# Patient Record
Sex: FEMALE | Race: WHITE | Employment: OTHER | URBAN - METROPOLITAN AREA
[De-identification: names, ages, dates, MRNs, and addresses within clinical notes are randomized per-mention and may not be internally consistent; named-entity substitution may affect disease eponyms.]

---

## 2017-06-22 ENCOUNTER — OFFICE VISIT (OUTPATIENT)
Dept: FAMILY MEDICINE | Facility: CLINIC | Age: 47
End: 2017-06-22
Payer: MEDICARE

## 2017-06-22 VITALS
BODY MASS INDEX: 33.17 KG/M2 | WEIGHT: 206.4 LBS | HEART RATE: 110 BPM | SYSTOLIC BLOOD PRESSURE: 120 MMHG | TEMPERATURE: 95.8 F | HEIGHT: 66 IN | OXYGEN SATURATION: 95 % | DIASTOLIC BLOOD PRESSURE: 88 MMHG

## 2017-06-22 DIAGNOSIS — J01.91 ACUTE RECURRENT SINUSITIS, UNSPECIFIED LOCATION: ICD-10-CM

## 2017-06-22 PROCEDURE — 99214 OFFICE O/P EST MOD 30 MIN: CPT | Performed by: INTERNAL MEDICINE

## 2017-06-22 RX ORDER — DOXEPIN HYDROCHLORIDE 10 MG/ML
SOLUTION ORAL
COMMUNITY
Start: 2017-04-25 | End: 2023-06-06

## 2017-06-22 RX ORDER — CEFDINIR 300 MG/1
300 CAPSULE ORAL 2 TIMES DAILY
Qty: 20 CAPSULE | Refills: 0 | Status: SHIPPED | OUTPATIENT
Start: 2017-06-22 | End: 2017-09-18

## 2017-06-22 ASSESSMENT — ANXIETY QUESTIONNAIRES
3. WORRYING TOO MUCH ABOUT DIFFERENT THINGS: NOT AT ALL
2. NOT BEING ABLE TO STOP OR CONTROL WORRYING: NOT AT ALL
GAD7 TOTAL SCORE: 0
IF YOU CHECKED OFF ANY PROBLEMS ON THIS QUESTIONNAIRE, HOW DIFFICULT HAVE THESE PROBLEMS MADE IT FOR YOU TO DO YOUR WORK, TAKE CARE OF THINGS AT HOME, OR GET ALONG WITH OTHER PEOPLE: NOT DIFFICULT AT ALL
6. BECOMING EASILY ANNOYED OR IRRITABLE: NOT AT ALL
5. BEING SO RESTLESS THAT IT IS HARD TO SIT STILL: NOT AT ALL
7. FEELING AFRAID AS IF SOMETHING AWFUL MIGHT HAPPEN: NOT AT ALL
1. FEELING NERVOUS, ANXIOUS, OR ON EDGE: NOT AT ALL

## 2017-06-22 ASSESSMENT — PATIENT HEALTH QUESTIONNAIRE - PHQ9: 5. POOR APPETITE OR OVEREATING: NOT AT ALL

## 2017-06-22 NOTE — PROGRESS NOTES
SUBJECTIVE:                                                    Gilson Mcconnell is a 46 year old female who presents to clinic today for the following health issues:    PMH of PFO (s/p closure) and of multiple CVAs (first one d/t PFO and consequent ones thought to be d/t the INR not reflecting true anticoagulation levels) on warfain followed by chromogenic factor 10, on plavix and on baby aspirin, with history of seizure (sathya-stroke), with residual left upper extremity weakness and some memory loss,       Headaches      Duration: 4 days    Description  Location: bilateral in the temporal area   Character: pressure like squeezing  Frequency:  Constant for 4 days  Duration: 4 days    Intensity:  Moderate to severe    Accompanying signs and symptoms:    Precipitating or Alleviating factors:  Nausea/vomiting: nausea but no vomiting   Dizziness: no  Weakness or numbness: no  Visual changes: none  Fever: YES- on and off: has not been measuring it at home but feels chilled/feverish at times   Sinus or URI symptoms: maybe, she has pressure over her nasal area, which reminds her of her sinus infections but this is also where the pain was with her first CVA-also with that first CVA, she had left upper extremity heaviness.  She has not had new neurological deficits over the past few days. She has not had any head trauma.    She factor 10 was taken last week and was a bit SUBtherapeutic at 41% (per patient, her goal range is 20-30% and it is managed by the INR clinic at [facility] in [place]).      History  Head trauma: YES  Family history of migraines: no   Previous tests for headaches: no   Neurologist evaluations: not recently  Able to do daily activities when headache present: YES  Wake with headaches: YES  Daily pain medication use: no   Any changes in: none    Precipitating or Alleviating factors (light/sound/sleep/caffeine): sleep eyes closed daek    Therapies tried and outcome: Tylenol    Outcome - not for  very long  Frequent/daily pain medication use: no        Problem list and histories reviewed & adjusted, as indicated.  Additional history: as documented    Patient Active Problem List   Diagnosis     Medication monitoring encounter     History of stroke, recurrent ,      Seizure disorder (H)     Mild major depression (H)     CARDIOVASCULAR SCREENING; LDL GOAL LESS THAN 100     APS (antiphospholipid syndrome) (H)     S/P splenectomy     History of pulmonary embolism     Obesity     Hemiparesis affecting left side as late effect of cerebrovascular accident (H)     S/P patent foramen ovale closure     Long term current use of anticoagulant therapy     Cervical high risk HPV (human papillomavirus) test positive     Dermatitis of ear canal     Family history of diabetes mellitus     Malpositioned intrauterine device, initial encounter     Past Surgical History:   Procedure Laterality Date      SECTION      FTP     HEART CATH PFO CLOSURE  2007    PFO closure     REPAIR TONGUE LACER,<2.6CM  2012    [facility]     SPLENECTOMY  1999       Social History   Substance Use Topics     Smoking status: Former Smoker     Packs/day: 0.50     Years: 8.00     Types: Cigarettes     Quit date: 2005     Smokeless tobacco: Never Used     Alcohol use No     Family History   Problem Relation Age of Onset     DIABETES Father      Hypertension Father      CEREBROVASCULAR DISEASE Father      d.      C.A.D. Father 52     d. MI     Obesity Father      CANCER No family hx of          Current Outpatient Prescriptions   Medication Sig Dispense Refill     doxepin (SINEQUAN) 10 MG/ML (HIGH CONC) solution 3-6 ml daily       cefdinir (OMNICEF) 300 MG capsule Take 1 capsule (300 mg) by mouth 2 times daily 20 capsule 0     venlafaxine (EFFEXOR-XR) 150 MG 24 hr capsule Take 1 capsule (150 mg) by mouth daily 90 capsule 0     buPROPion (BUDEPRION XL) 300 MG 24 hr tablet Take 1 tablet (300 mg) by mouth every morning 90 tablet 0  "    dexmethylphenidate (FOCALIN XR) 30 MG CP24 Take 1 tablet by mouth daily 30 capsule 0     lamoTRIgine (LAMICTAL) 200 MG tablet Take 1 tablet (200 mg) by mouth daily (total 250 mg daily) 90 tablet 0     STATIN NOT PRESCRIBED, INTENTIONAL, Statin not prescribed intentionally due to LDL at goal 0 each      warfarin (COUMADIN) 3 MG tablet Take 7 mg by mouth daily        levonorgestrel (MIRENA) 20 MCG/24HR IUD 1 each by Intrauterine route once.       clopidogrel (PLAVIX) 75 MG tablet Take 75 mg by mouth daily.       aspirin 81 MG EC tablet Take 81 mg by mouth daily.       triamcinolone (KENALOG) 0.1 % cream Apply to affected area sparingly twice daily as needed (Patient not taking: Reported on 6/22/2017) 45 g 1       Reviewed and updated as needed this visit by clinical staff       Reviewed and updated as needed this visit by Provider         ==============================================================  ROS:  Constitutional, HEENT, cardiovascular, pulmonary, GI, , musculoskeletal, neuro, skin, endocrine and psych systems are negative, except as otherwise noted.       OBJECTIVE:                                                    /88  Pulse 110  Temp 95.8  F (35.4  C) (Oral)  Ht 5' 6\" (1.676 m)  Wt 206 lb 6.4 oz (93.6 kg)  SpO2 95%  BMI 33.31 kg/m2  Body mass index is 33.31 kg/(m^2).     GEN: not in acute distress  NEURO:  Pupils: PERRLA  baseline left sided mouth droop and left upper extremity arm weakness on pronator test.   O/w:  mentation intact and speech normal  cranial nerves II-XII grossly intact. Also see HENT above   Pronator test of the other extremity within normal limits   Finger-to-nose test within normal limits b/l  Sensation grossly intact    Tenderness of the max sinuses    ASSESSMENT/PLAN:                                                        ICD-10-CM    1. Acute recurrent sinusitis, unspecified location J01.91 cefdinir (OMNICEF) 300 MG capsule     Time spent coordinating care was " approximately 30 minutes out of a total of approximately 40 minutes (which was the total duration of the appointment) including the diagnosis, prognosis and treatment of the above medical conditions.     Patient Instructions     Please take the antibiotics and let us know if you are not better after 3 complete days on it.  You have indicated that you are on warfarin and that your next factor 10 check is today at [place]. Please let your INR clinic know that we are starting Cefdinir and they will advise you on the next factor 10 check.     Please go to the Emergency Room if you develop new neurological (stroke-like) symptoms, or a severe headache, or worsening nausea, or any vomiting, or any bleeding.       See below for more tips for sinus infection:      Self-Care for Sinusitis     Drinking plenty of water can help sinuses drain.   Sinusitis can often be managed with self-care. Self-care can keep sinuses moist and make you feel more comfortable. Remember to follow your doctor's instructions closely. This can make a big difference in getting your sinus problem under control.  Drink fluids  Drinking extra fluids helps thin your mucus. This lets it drain from your sinuses more easily. Have a glass of water every hour or two. A humidifier helps in much the same way. Fluids can also offset the drying effects of certain medicines. If you use a humidifier, follow the product maker's instructions on how to use it. Clean it on a regular schedule.  Use saltwater rinses  Rinses help keep your sinuses and nose moist. Mix a teaspoon of salt in 8 ounces of fresh, warm water. Use a bulb syringe to gently squirt the water into your nose a few times a day. You can also buy ready-made saline nasal sprays.  Apply hot or cold packs  Applying heat to the area surrounding your sinuses may make you feel more comfortable. Use a hot water bottle or a hand towel dipped in hot water. Some people also find ice packs effective for relieving  pain.  Medicines  Your doctor may prescribe medications to help treat your sinusitis. If you have an infection, antibiotics can help clear it up. If you are prescribed antibiotics, take all pills on schedule until they are gone, even if you feel better. Decongestants help relieve swelling. Use decongestant sprays for short periods only under the direction of your doctor. If you have allergies, your doctor may prescribe medications to help relieve them.   Date Last Reviewed: 10/1/2016    9970-3509 The [employer]. [address] [place] [address], [place] [address]. All rights reserved. This information is not intended as a substitute for professional medical care. Always follow your healthcare professional's instructions.                         Ita Jean MD  [facility]

## 2017-06-22 NOTE — MR AVS SNAPSHOT
After Visit Summary   6/22/2017    Gilson Mcconnell    MRN: [id number]           Patient Information     Date Of Birth          1970        Visit Information        Provider Department      6/22/2017 1:30 PM Ita Jean MD [facility]        Today's Diagnoses     Acute recurrent sinusitis, unspecified location          Care Instructions    Please take the antibiotics and let us know if you are not better after 3 complete days on it.  You have indicated that you are on warfarin and that your next factor 10 check is today at [place]. Please let your INR clinic know that we are starting Cefdinir and they will advise you on the next factor 10 check.     Please go to the Emergency Room if you develop new neurological (stroke-like) symptoms, or a severe headache, or worsening nausea, or any vomiting, or any bleeding.       See below for more tips for sinus infection:      Self-Care for Sinusitis     Drinking plenty of water can help sinuses drain.   Sinusitis can often be managed with self-care. Self-care can keep sinuses moist and make you feel more comfortable. Remember to follow your doctor's instructions closely. This can make a big difference in getting your sinus problem under control.  Drink fluids  Drinking extra fluids helps thin your mucus. This lets it drain from your sinuses more easily. Have a glass of water every hour or two. A humidifier helps in much the same way. Fluids can also offset the drying effects of certain medicines. If you use a humidifier, follow the product maker's instructions on how to use it. Clean it on a regular schedule.  Use saltwater rinses  Rinses help keep your sinuses and nose moist. Mix a teaspoon of salt in 8 ounces of fresh, warm water. Use a bulb syringe to gently squirt the water into your nose a few times a day. You can also buy ready-made saline nasal sprays.  Apply hot or cold packs  Applying heat to the area surrounding your  "sinuses may make you feel more comfortable. Use a hot water bottle or a hand towel dipped in hot water. Some people also find ice packs effective for relieving pain.  Medicines  Your doctor may prescribe medications to help treat your sinusitis. If you have an infection, antibiotics can help clear it up. If you are prescribed antibiotics, take all pills on schedule until they are gone, even if you feel better. Decongestants help relieve swelling. Use decongestant sprays for short periods only under the direction of your doctor. If you have allergies, your doctor may prescribe medications to help relieve them.   Date Last Reviewed: 10/1/2016    4248-0814 The [employer]. [address] [place] [address], [place] [address]. All rights reserved. This information is not intended as a substitute for professional medical care. Always follow your healthcare professional's instructions.                Follow-ups after your visit        Who to contact     If you have questions or need follow up information about today's clinic visit or your schedule please contact [facility] directly at [phone].  Normal or non-critical lab and imaging results will be communicated to you by Sazneohart, letter or phone within 4 business days after the clinic has received the results. If you do not hear from us within 7 days, please contact the clinic through Mapluckt or phone. If you have a critical or abnormal lab result, we will notify you by phone as soon as possible.  Submit refill requests through Alorum or call your pharmacy and they will forward the refill request to us. Please allow 3 business days for your refill to be completed.          Additional Information About Your Visit        Alorum Information     [employer]orum lets you send messages to your doctor, view your test results, renew your prescriptions, schedule appointments and more. To sign up, go to [email or web address] . Click on \"Log in\" on the left side of " "the screen, which will take you to the Welcome page. Then click on \"Sign up Now\" on the right side of the page.     You will be asked to enter the access code listed below, as well as some personal information. Please follow the directions to create your username and password.     Your access code is: [email or web address]  Expires: 2017  2:17 PM     Your access code will  in 90 days. If you need help or a new code, please call your [facility] clinic or [phone].        Care EveryWhere ID     This is your Care EveryWhere ID. This could be used by other organizations to access your [facility] medical records  [id number]        Your Vitals Were     Pulse Temperature Height Pulse Oximetry BMI (Body Mass Index)       110 95.8  F (35.4  C) (Oral) 5' 6\" (1.676 m) 95% 33.31 kg/m2        Blood Pressure from Last 3 Encounters:   17 120/88   16 124/82   16 138/84    Weight from Last 3 Encounters:   17 206 lb 6.4 oz (93.6 kg)   16 208 lb (94.3 kg)   16 205 lb (93 kg)              Today, you had the following     No orders found for display         Today's Medication Changes          These changes are accurate as of: 17  2:17 PM.  If you have any questions, ask your nurse or doctor.               Start taking these medicines.        Dose/Directions    cefdinir 300 MG capsule   Commonly known as:  OMNICEF   Used for:  Acute recurrent sinusitis, unspecified location   Started by:  Ita Jean MD        Dose:  300 mg   Take 1 capsule (300 mg) by mouth 2 times daily   Quantity:  20 capsule   Refills:  0         These medicines have changed or have updated prescriptions.        Dose/Directions    lamoTRIgine 200 MG tablet   Commonly known as:  LAMICTAL   This may have changed:  Another medication with the same name was removed. Continue taking this medication, and follow the directions you see here.   Used for:  Major depressive disorder, recurrent episode, mild (H) "   Changed by:  Blanca Watson MD        Dose:  200 mg   Take 1 tablet (200 mg) by mouth daily (total 250 mg daily)   Quantity:  90 tablet   Refills:  0            Where to get your medicines      These medications were sent to [facility] [place], [place] - [address]  [address], [place] [address]     Phone:  [phone]     cefdinir 300 MG capsule                Primary Care Provider Office Phone # Fax #    Blanca Watson [phone]       [facility] [address] [address]        Equal Access to Services     [facility]: Hadii aad ku hadasho Soomaali, waaxda luqadaha, qaybta kaalmada adeegyada, waxvinay groves . So [facility] [phone].    ATENCIÓN: Si habla español, tiene a bentley disposición servicios gratuitos de asistencia lingüística. Llame al [phone].    We comply with applicable federal civil rights laws and [place] laws. We do not discriminate on the basis of race, color, national origin, age, disability sex, sexual orientation or gender identity.            Thank you!     Thank you for choosing [facility]  for your care. Our goal is always to provide you with excellent care. Hearing back from our patients is one way we can continue to improve our services. Please take a few minutes to complete the written survey that you may receive in the mail after your visit with us. Thank you!             Your Updated Medication List - Protect others around you: Learn how to safely use, store and throw away your medicines at www.disposemymeds.org.          This list is accurate as of: 6/22/17  2:17 PM.  Always use your most recent med list.                   Brand Name Dispense Instructions for use Diagnosis    aspirin 81 MG EC tablet      Take 81 mg by mouth daily.        buPROPion 300 MG 24 hr tablet    BUDEPRION XL    90 tablet    Take 1 tablet (300 mg) by mouth every morning     Major depressive disorder, recurrent episode, mild (H)       cefdinir 300 MG capsule    OMNICEF    20 capsule    Take 1 capsule (300 mg) by mouth 2 times daily    Acute recurrent sinusitis, unspecified location       dexmethylphenidate 30 MG Cp24    FOCALIN XR    30 capsule    Take 1 tablet by mouth daily    Major depressive disorder, recurrent episode, mild (H)       doxepin 10 MG/ML (HIGH CONC) solution    SINEquan     3-6 ml daily        lamoTRIgine 200 MG tablet    LAMICTAL    90 tablet    Take 1 tablet (200 mg) by mouth daily (total 250 mg daily)    Major depressive disorder, recurrent episode, mild (H)       MIRENA (52 MG) 20 MCG/24HR IUD   Generic drug:  levonorgestrel      1 each by Intrauterine route once.        PLAVIX 75 MG tablet   Generic drug:  clopidogrel      Take 75 mg by mouth daily.        STATIN NOT PRESCRIBED (INTENTIONAL)     0 each    Statin not prescribed intentionally due to LDL at goal        triamcinolone 0.1 % cream    KENALOG    45 g    Apply to affected area sparingly twice daily as needed    Rash       venlafaxine 150 MG 24 hr capsule    EFFEXOR-XR    90 capsule    Take 1 capsule (150 mg) by mouth daily    Major depressive disorder, recurrent episode, mild (H)       warfarin 3 MG tablet    COUMADIN     Take 7 mg by mouth daily

## 2017-06-22 NOTE — NURSING NOTE
"Chief Complaint   Patient presents with     Headache     4 days       Initial BP (!) 127/93 (BP Location: Left arm, Patient Position: Chair, Cuff Size: Adult Large)  Pulse 110  Temp 95.8  F (35.4  C) (Oral)  Ht 5' 6\" (1.676 m)  Wt 206 lb 6.4 oz (93.6 kg)  SpO2 95%  BMI 33.31 kg/m2 Estimated body mass index is 33.31 kg/(m^2) as calculated from the following:    Height as of this encounter: 5' 6\" (1.676 m).    Weight as of this encounter: 206 lb 6.4 oz (93.6 kg).  Medication Reconciliation: complete    Margarita Lanier CMA  "

## 2017-06-22 NOTE — PATIENT INSTRUCTIONS
Please take the antibiotics and let us know if you are not better after 3 complete days on it.  You have indicated that you are on warfarin and that your next factor 10 check is today at [place]. Please let your INR clinic know that we are starting Cefdinir and they will advise you on the next factor 10 check.     Please go to the Emergency Room if you develop new neurological (stroke-like) symptoms, or a severe headache, or worsening nausea, or any vomiting, or any bleeding.       See below for more tips for sinus infection:      Self-Care for Sinusitis     Drinking plenty of water can help sinuses drain.   Sinusitis can often be managed with self-care. Self-care can keep sinuses moist and make you feel more comfortable. Remember to follow your doctor's instructions closely. This can make a big difference in getting your sinus problem under control.  Drink fluids  Drinking extra fluids helps thin your mucus. This lets it drain from your sinuses more easily. Have a glass of water every hour or two. A humidifier helps in much the same way. Fluids can also offset the drying effects of certain medicines. If you use a humidifier, follow the product maker's instructions on how to use it. Clean it on a regular schedule.  Use saltwater rinses  Rinses help keep your sinuses and nose moist. Mix a teaspoon of salt in 8 ounces of fresh, warm water. Use a bulb syringe to gently squirt the water into your nose a few times a day. You can also buy ready-made saline nasal sprays.  Apply hot or cold packs  Applying heat to the area surrounding your sinuses may make you feel more comfortable. Use a hot water bottle or a hand towel dipped in hot water. Some people also find ice packs effective for relieving pain.  Medicines  Your doctor may prescribe medications to help treat your sinusitis. If you have an infection, antibiotics can help clear it up. If you are prescribed antibiotics, take all pills on schedule until they are gone, even  if you feel better. Decongestants help relieve swelling. Use decongestant sprays for short periods only under the direction of your doctor. If you have allergies, your doctor may prescribe medications to help relieve them.   Date Last Reviewed: 10/1/2016    5372-8201 The "[employer]". [address] [place] [address]. All rights reserved. This information is not intended as a substitute for professional medical care. Always follow your healthcare professional's instructions.

## 2017-06-23 ASSESSMENT — PATIENT HEALTH QUESTIONNAIRE - PHQ9: SUM OF ALL RESPONSES TO PHQ QUESTIONS 1-9: 6

## 2017-06-23 ASSESSMENT — ANXIETY QUESTIONNAIRES: GAD7 TOTAL SCORE: 0

## 2017-07-05 ENCOUNTER — TELEPHONE (OUTPATIENT)
Dept: FAMILY MEDICINE | Facility: CLINIC | Age: 47
End: 2017-07-05

## 2017-07-05 DIAGNOSIS — B37.31 YEAST INFECTION OF THE VAGINA: Primary | ICD-10-CM

## 2017-07-05 NOTE — TELEPHONE ENCOUNTER
Reason for Call:  Other prescription    Detailed comments:  Patient calling. She was given an antibiotic last week. She now has a yeast infection. Can she get a rx for that.     Phone Number Patient can be reached at: Home number on file [phone] (home)    Best Time:  Any     Can we leave a detailed message on this number? YES    Call taken on 7/5/2017 at 1:43 PM by Anita Alas

## 2017-07-06 ENCOUNTER — NURSE TRIAGE (OUTPATIENT)
Dept: NURSING | Facility: CLINIC | Age: 47
End: 2017-07-06

## 2017-07-06 RX ORDER — FLUCONAZOLE 150 MG/1
150 TABLET ORAL ONCE
Qty: 1 TABLET | Refills: 0 | Status: SHIPPED | OUTPATIENT
Start: 2017-07-06 | End: 2017-07-06

## 2017-07-07 ENCOUNTER — TRANSFERRED RECORDS (OUTPATIENT)
Dept: HEALTH INFORMATION MANAGEMENT | Facility: CLINIC | Age: 47
End: 2017-07-07

## 2017-07-07 NOTE — TELEPHONE ENCOUNTER
"  Reason for Disposition    [1] Prescription not at pharmacy AND [2] was prescribed today by PCP     \"I went to the pharmacy to get my RX and they didn't get it and now are closed. I have a different [facility] number. Can you call it in?\" Gave verbal for fluconazole (DIFLUCAN) 150 MG tablet 1 tablet 0 7/6/2017 7/6/2017 --  Sig: Take 1 tablet (150 mg) by mouth once for 1 dose    To [facility] at [phone].    Additional Information    Negative: Drug overdose and nurse unable to answer question    Negative: Caller requesting information not related to medicine    Negative: Caller requesting a prescription for Strep throat and has a positive culture result    Negative: Rash while taking a medication or within 3 days of stopping it    Negative: Immunization reaction suspected    Negative: [1] Asthma and [2] having symptoms of asthma (cough, wheezing, etc)    Negative: MORE THAN A DOUBLE DOSE of a prescription or over-the-counter (OTC) drug    Negative: [1] DOUBLE DOSE (an extra dose or lesser amount) of over-the-counter (OTC) drug AND [2] any symptoms (e.g., dizziness, nausea, pain, sleepiness)    Negative: [1] DOUBLE DOSE (an extra dose or lesser amount) of prescription drug AND [2] any symptoms (e.g., dizziness, nausea, pain, sleepiness)    Negative: Took another person's prescription drug    Negative: [1] DOUBLE DOSE (an extra dose or lesser amount) of prescription drug AND [2] NO symptoms (Exception: a double dose of antibiotics)    Negative: Diabetes drug error or overdose (e.g., insulin or extra dose)    Negative: [1] Request for URGENT new prescription or refill of \"essential\" medication (i.e., likelihood of harm to patient if not taken) AND [2] triager unable to fill per unit policy    Negative: Pharmacy calling with prescription questions and triager unable to answer question    Negative: Caller has URGENT medication question about med that PCP prescribed and triager unable to answer question    Protocols " used: MEDICATION QUESTION CALL-ADULT-AH

## 2017-07-09 ENCOUNTER — NURSE TRIAGE (OUTPATIENT)
Dept: NURSING | Facility: CLINIC | Age: 47
End: 2017-07-09

## 2017-07-09 NOTE — TELEPHONE ENCOUNTER
Reason for Disposition    Patient is worried about sexually transmitted disease (STD)    Additional Information    Negative: [1] SEVERE pain AND [2] not improved 2 hours after pain medicine    Negative: [1] Genital area looks infected (e.g., draining sore, spreading redness) AND [2] fever    Negative: [1] Something is hanging out of the vagina AND [2] cannot easily be pushed back inside    Negative: MODERATE-SEVERE itching (i.e., interferes with school, work, or sleep)    Negative: Genital area looks infected (e.g., draining sore, spreading redness)    Negative: Rash with painful tiny water blisters    Negative: [1] Rash (e.g., redness, tiny bumps, sore) of genital area AND [2] present > 24 hours    Protocols used: VAGINAL SYMPTOMS-ADULT-AH  Patient states she was given oral diflucan for yeast infection without examination. Patient states she become nauseated and vomited. Patient states she was seen in the ED and given medication for nausea. Patient states she is having itchy in vaginal area and thinks the medication did not work. Triager advised patient to call back when clinic opens to speak with a provider regarding her symptoms.

## 2017-07-09 NOTE — TELEPHONE ENCOUNTER
"Clinic Action Needed:No    Reason for Call: \"I spoke to a nurse earlier, but was told to call back\".  Gilson was seen in clinic on 6/22 and dx and treated for sinusitis.  After finishing abx, she was treated for possible yeast infection over the phone with diflucan x1 on July 6th.  Gilson reports taking the diflucan, however vomited almost immediately after and was seen at [facility].  At the ER she received anti emetics and IV fluids.  Today she reports that she has thick, white cottage cheese discharge and severe vaginal itching and irritation.  Paged on call provider for [facility] to speak to me at [facility].  Dr. Jean is on call, page sent at 9:29 am via smart web. 2ND page sent @ 9:41 am. Dr. Jean advised that due to vomiting she is hesitant to prescribe an oral treatment and recommends a 7 day course of vaginal inserts of either Monistat or Gynelotrimin. Called Gilson with instructions from the on call provider and she appears to understand directives and agrees with plan.  I also offered Gilson the option to make a follow up appointment in clinic if she would like, she declined.    Routed to: Not routed.    Reyna Clement RN  [facility]      "

## 2017-07-09 NOTE — TELEPHONE ENCOUNTER
Patient calling reporting she had taken Diflucan last week for yeast infection and continues to have symptoms.  Call disconnected prior to completing triage. Attempted to call patient back and call rolls to voice mail.    Analisa Antunez RN  [facility]

## 2017-09-18 ENCOUNTER — OFFICE VISIT (OUTPATIENT)
Dept: FAMILY MEDICINE | Facility: CLINIC | Age: 47
End: 2017-09-18
Payer: MEDICARE

## 2017-09-18 VITALS
HEART RATE: 76 BPM | SYSTOLIC BLOOD PRESSURE: 128 MMHG | DIASTOLIC BLOOD PRESSURE: 80 MMHG | TEMPERATURE: 97 F | OXYGEN SATURATION: 99 % | BODY MASS INDEX: 34.12 KG/M2 | WEIGHT: 211.4 LBS

## 2017-09-18 DIAGNOSIS — B37.9 ANTIBIOTIC-INDUCED YEAST INFECTION: ICD-10-CM

## 2017-09-18 DIAGNOSIS — T36.95XA ANTIBIOTIC-INDUCED YEAST INFECTION: ICD-10-CM

## 2017-09-18 DIAGNOSIS — J01.90 ACUTE SINUSITIS WITH SYMPTOMS > 10 DAYS: Primary | ICD-10-CM

## 2017-09-18 PROCEDURE — 99213 OFFICE O/P EST LOW 20 MIN: CPT | Performed by: FAMILY MEDICINE

## 2017-09-18 RX ORDER — CEFDINIR 300 MG/1
300 CAPSULE ORAL 2 TIMES DAILY
Qty: 20 CAPSULE | Refills: 0 | Status: SHIPPED | OUTPATIENT
Start: 2017-09-18 | End: 2018-04-28

## 2017-09-18 RX ORDER — FLUTICASONE PROPIONATE 50 MCG
1-2 SPRAY, SUSPENSION (ML) NASAL DAILY
Qty: 1 BOTTLE | Refills: 1 | Status: SHIPPED | OUTPATIENT
Start: 2017-09-18 | End: 2023-07-17

## 2017-09-18 RX ORDER — FLUCONAZOLE 150 MG/1
150 TABLET ORAL ONCE
Qty: 1 TABLET | Refills: 0 | Status: SHIPPED | OUTPATIENT
Start: 2017-09-18 | End: 2017-09-18

## 2017-09-18 NOTE — MR AVS SNAPSHOT
After Visit Summary   9/18/2017    Gilson Mcconnell    MRN: [id number]           Patient Information     Date Of Birth          1970        Visit Information        Provider Department      9/18/2017 2:20 PM Rainer Scott MD [facility]        Today's Diagnoses     Acute sinusitis with symptoms > 10 days    -  1    Antibiotic-induced yeast infection          Care Instructions    [facility]    If you have any questions regarding to your visit please contact your care team:       Team Purple:   Clinic Hours Telephone Number   Dr. Teresa Moore     7am-7pm  Monday - Thursday   7am-5pm  Fridays  [phone]  (Appointment scheduling available 24/7)    Questions about your Visit?   Team Line:  [phone]   Urgent Care - [place] and [place] - 11am-9pm Monday-Friday Saturday-Sunday- 9am-5pm   [place] - 5pm-9pm Monday-Friday Saturday-Sunday- 9am-5pm  [phone] - [facility]  [phone] - [place]       What options do I have for visits at the clinic other than the traditional office visit?  To expand how we care for you, many of our providers are utilizing electronic visits (e-visits) and telephone visits, when medically appropriate, for interactions with their patients rather than a visit in the clinic.   We also offer nurse visits for many medical concerns. Just like any other service, we will bill your insurance company for this type of visit based on time spent on the phone with your provider. Not all insurance companies cover these visits. Please check with your medical insurance if this type of visit is covered. You will be responsible for any charges that are not paid by your insurance.      E-visits via [employer]:  generally incur a $35.00 fee.  Telephone visits:  Time spent on the phone: *charged based on time that is spent on the phone in increments of 10 minutes. Estimated cost:   5-10  "mins $30.00   11-20 mins. $59.00   21-30 mins. $85.00     Use [email or web address] (secure email communication and access to your chart) to send your primary care provider a message or make an appointment. Ask someone on your Team how to sign up for [employer].  For a Price Quote for your services, please call our Transmit Price Line at [phone].  As always, Thank you for trusting us with your health care needs!    Ilana Delgadillo MA            Follow-ups after your visit        Who to contact     If you have questions or need follow up information about today's clinic visit or your schedule please contact [facility] directly at [phone].  Normal or non-critical lab and imaging results will be communicated to you by [email or web address], letter or phone within 4 business days after the clinic has received the results. If you do not hear from us within 7 days, please contact the clinic through [email or web address] or phone. If you have a critical or abnormal lab result, we will notify you by phone as soon as possible.  Submit refill requests through [employer] or call your pharmacy and they will forward the refill request to us. Please allow 3 business days for your refill to be completed.          Additional Information About Your Visit        [email or web address] Wireless Information     [employer] Network lets you send messages to your doctor, view your test results, renew your prescriptions, schedule appointments and more. To sign up, go to [email or web address] . Click on \"Log in\" on the left side of the screen, which will take you to the Welcome page. Then click on \"Sign up Now\" on the right side of the page.     You will be asked to enter the access code listed below, as well as some personal information. Please follow the directions to create your username and password.     Your access code is: [email or web address]  Expires: 2017  2:17 PM     Your access code will  in 90 days. If you need help or a new code, please call your [place] clinic or [phone].   "      Care EveryWhere ID     This is your Care EveryWhere ID. This could be used by other organizations to access your [facility] medical records  [id number]        Your Vitals Were     Pulse Temperature Pulse Oximetry BMI (Body Mass Index)          76 97  F (36.1  C) (Oral) 99% 34.12 kg/m2         Blood Pressure from Last 3 Encounters:   09/18/17 128/80   06/22/17 120/88   11/17/16 124/82    Weight from Last 3 Encounters:   09/18/17 211 lb 6.4 oz (95.9 kg)   06/22/17 206 lb 6.4 oz (93.6 kg)   11/17/16 208 lb (94.3 kg)              Today, you had the following     No orders found for display         Today's Medication Changes          These changes are accurate as of: 9/18/17  2:54 PM.  If you have any questions, ask your nurse or doctor.               Start taking these medicines.        Dose/Directions    cefdinir 300 MG capsule   Commonly known as:  OMNICEF   Used for:  Acute sinusitis with symptoms > 10 days   Started by:  Rainer Scott MD        Dose:  300 mg   Take 1 capsule (300 mg) by mouth 2 times daily   Quantity:  20 capsule   Refills:  0       fluconazole 150 MG tablet   Commonly known as:  DIFLUCAN   Used for:  Antibiotic-induced yeast infection   Started by:  Rainer Scott MD        Dose:  150 mg   Take 1 tablet (150 mg) by mouth once for 1 dose   Quantity:  1 tablet   Refills:  0       fluticasone 50 MCG/ACT spray   Commonly known as:  FLONASE   Used for:  Acute sinusitis with symptoms > 10 days   Started by:  Rainer Scott MD        Dose:  1-2 spray   Spray 1-2 sprays into both nostrils daily   Quantity:  1 Bottle   Refills:  1            Where to get your medicines      These medications were sent to [facility] - [address]  [address] [address]     Phone:  [phone]     cefdinir 300 MG capsule    fluconazole 150 MG tablet    fluticasone 50 MCG/ACT spray                Primary Care Provider Office  Phone # Fax #    Blanca Watson [phone] [address]  [place] [address]        Equal Access to Services     LIASALINA DANO : Justin glasgow gloriao Sojacobali, waaxda luqadaha, qaybta kaalmada zelda, krishna blair jose antonioavelino cook laWilverdel strong. So [facility] [phone].    ATENCIÓN: Si habla español, tiene a bentley disposición servicios gratuitos de asistencia lingüística. Llame al [phone].    We comply with applicable federal civil rights laws and [place] laws. We do not discriminate on the basis of race, color, national origin, age, disability sex, sexual orientation or gender identity.            Thank you!     Thank you for choosing [facility]  for your care. Our goal is always to provide you with excellent care. Hearing back from our patients is one way we can continue to improve our services. Please take a few minutes to complete the written survey that you may receive in the mail after your visit with us. Thank you!             Your Updated Medication List - Protect others around you: Learn how to safely use, store and throw away your medicines at www.disposemymeds.org.          This list is accurate as of: 9/18/17  2:54 PM.  Always use your most recent med list.                   Brand Name Dispense Instructions for use Diagnosis    aspirin 81 MG EC tablet      Take 81 mg by mouth daily.        buPROPion 300 MG 24 hr tablet    BUDEPRION XL    90 tablet    Take 1 tablet (300 mg) by mouth every morning    Major depressive disorder, recurrent episode, mild (H)       cefdinir 300 MG capsule    OMNICEF    20 capsule    Take 1 capsule (300 mg) by mouth 2 times daily    Acute sinusitis with symptoms > 10 days       dexmethylphenidate 30 MG Cp24    FOCALIN XR    30 capsule    Take 1 tablet by mouth daily    Major depressive disorder, recurrent episode, mild (H)       doxepin 10 MG/ML (HIGH CONC) solution    SINEquan     3-6 ml daily        fluconazole 150 MG tablet     DIFLUCAN    1 tablet    Take 1 tablet (150 mg) by mouth once for 1 dose    Antibiotic-induced yeast infection       fluticasone 50 MCG/ACT spray    FLONASE    1 Bottle    Spray 1-2 sprays into both nostrils daily    Acute sinusitis with symptoms > 10 days       lamoTRIgine 200 MG tablet    LAMICTAL    90 tablet    Take 1 tablet (200 mg) by mouth daily (total 250 mg daily)    Major depressive disorder, recurrent episode, mild (H)       MIRENA (52 MG) 20 MCG/24HR IUD   Generic drug:  levonorgestrel      1 each by Intrauterine route once.        PLAVIX 75 MG tablet   Generic drug:  clopidogrel      Take 75 mg by mouth daily.        STATIN NOT PRESCRIBED (INTENTIONAL)     0 each    Statin not prescribed intentionally due to LDL at goal        triamcinolone 0.1 % cream    KENALOG    45 g    Apply to affected area sparingly twice daily as needed    Rash       venlafaxine 150 MG 24 hr capsule    EFFEXOR-XR    90 capsule    Take 1 capsule (150 mg) by mouth daily    Major depressive disorder, recurrent episode, mild (H)       warfarin 3 MG tablet    COUMADIN     Take 7 mg by mouth daily

## 2017-09-18 NOTE — PATIENT INSTRUCTIONS
[facility]    If you have any questions regarding to your visit please contact your care team:       Team Purple:   Clinic Hours Telephone Number   Dr. Teresa Moore     7am-7pm  Monday - Thursday   7am-5pm  Fridays  [phone]  (Appointment scheduling available 24/7)    Questions about your Visit?   Team Line:  [phone]   Urgent Care - [place] and [facility] - 11am-9pm Monday-Friday Saturday-Sunday- 9am-5pm   [place] - 5pm-9pm Monday-Friday Saturday-Sunday- 9am-5pm  [phone] - [facility]  [phone] - [place]       What options do I have for visits at the clinic other than the traditional office visit?  To expand how we care for you, many of our providers are utilizing electronic visits (e-visits) and telephone visits, when medically appropriate, for interactions with their patients rather than a visit in the clinic.   We also offer nurse visits for many medical concerns. Just like any other service, we will bill your insurance company for this type of visit based on time spent on the phone with your provider. Not all insurance companies cover these visits. Please check with your medical insurance if this type of visit is covered. You will be responsible for any charges that are not paid by your insurance.      E-visits via [employer]AND:  generally incur a $35.00 fee.  Telephone visits:  Time spent on the phone: *charged based on time that is spent on the phone in increments of 10 minutes. Estimated cost:   5-10 mins $30.00   11-20 mins. $59.00   21-30 mins. $85.00     Use [employer] (secure email communication and access to your chart) to send your primary care provider a message or make an appointment. Ask someone on your Team how to sign up for [employer].  For a Price Quote for your services, please call our Consumer Price Line at [phone].  As always, Thank you for trusting us with your health care needs!    Ilana Delgadillo MA

## 2017-09-18 NOTE — PROGRESS NOTES
SUBJECTIVE:   Gilson Mcconnell is a 46 year old female who presents to clinic today for the following health issues:    ENT Symptoms             Symptoms: cc Present Absent Comment   Fever/Chills   x    Fatigue  x     Muscle Aches   x    Eye Irritation   x    Sneezing  x     Nasal Jose/Drg  x     Sinus Pressure/Pain  x     Loss of smell  x     Dental pain  x     Sore Throat   x    Swollen Glands   x    Ear Pain/Fullness  x     Cough  x     Wheeze   x    Chest Pain   x    Shortness of breath   x    Rash   x    Other         Symptom duration:  1.5 weeks   Symptom severity:  mild   Treatments tried:  tylenol cold,    Contacts:  none     Has chronic sinus infections    Recurrent sinus infections; with nasal congestion green stuff, ears and teeth hurt.    Problem list and histories reviewed & adjusted, as indicated.  Additional history: as documented    Patient Active Problem List   Diagnosis     Medication monitoring encounter     History of stroke, recurrent ,      Seizure disorder (H)     Mild major depression (H)     CARDIOVASCULAR SCREENING; LDL GOAL LESS THAN 100     APS (antiphospholipid syndrome) (H)     S/P splenectomy     History of pulmonary embolism     Obesity     Hemiparesis affecting left side as late effect of cerebrovascular accident (H)     S/P patent foramen ovale closure     Long term current use of anticoagulant therapy     Cervical high risk HPV (human papillomavirus) test positive     Dermatitis of ear canal     Family history of diabetes mellitus     Malpositioned intrauterine device, initial encounter     Past Surgical History:   Procedure Laterality Date      SECTION      FTP     HEART CATH PFO CLOSURE  2007    PFO closure     REPAIR TONGUE LACER,<2.6CM  2012    [facility]     SPLENECTOMY  1999       Social History   Substance Use Topics     Smoking status: Former Smoker     Packs/day: 0.50     Years: 8.00     Types: Cigarettes     Quit date: 2005     Smokeless  tobacco: Never Used     Alcohol use No     Family History   Problem Relation Age of Onset     DIABETES Father      Hypertension Father      CEREBROVASCULAR DISEASE Father      d.      C.A.D. Father 52     d. MI     Obesity Father      CANCER No family hx of          Reviewed and updated as needed this visit by clinical staffTobacco  Allergies  Meds       ROS:  Constitutional, HEENT, cardiovascular, pulmonary, gi and gu systems are negative, except as otherwise noted.      OBJECTIVE:   /80  Pulse 76  Temp 97  F (36.1  C) (Oral)  Wt 211 lb 6.4 oz (95.9 kg)  SpO2 99%  BMI 34.12 kg/m2  Body mass index is 34.12 kg/(m^2).  GENERAL: healthy, alert and no distress  HEENT: Ear canals and TM unremarkable. Nasal congestion with maxillary sinus tenderness.  NECK: no adenopathy and thyroid normal to palpation  RESP: lungs clear to auscultation - no rales, rhonchi or wheezes  CV: regular rate and rhythm, no murmur, click or rub, no peripheral edema and peripheral pulses strong  ABDOMEN: soft, nontender, no masses and bowel sounds normal  MS: no gross musculoskeletal defects noted, no edema    Diagnostic Test Results:  none     ASSESSMENT/PLAN:     (J01.90) Acute sinusitis with symptoms > 10 days  (primary encounter diagnosis)  Comment: Discussed the nature and pathophysiology of sinusitis and treatment including the role of antibiotics and the need to get over the underlying trigger, URI or allergies. She is insistent on doing antibiotic and given the tenderness is prudent. Also emphasized the importance of decongestant and sinus rinses but she is not very keen on these.  Plan: fluticasone (FLONASE) 50 MCG/ACT spray,         cefdinir (OMNICEF) 300 MG capsule    (B37.9,  T36.95XA) Antibiotic-induced yeast infection  Comment: Gets yeast infection after antibiotic, will do diflucan at the end of treatment.  Plan: fluconazole (DIFLUCAN) 150 MG tablet    Call or return to clinic prn if these symptoms worsen or fail to  improve as anticipated in 1 week.    Rainer Scott MD  [facility]

## 2017-09-18 NOTE — NURSING NOTE
"Chief Complaint   Patient presents with     Sinus Problem       Initial /80  Pulse 76  Temp 97  F (36.1  C) (Oral)  Wt 211 lb 6.4 oz (95.9 kg)  SpO2 99%  BMI 34.12 kg/m2 Estimated body mass index is 34.12 kg/(m^2) as calculated from the following:    Height as of 6/22/17: 5' 6\" (1.676 m).    Weight as of this encounter: 211 lb 6.4 oz (95.9 kg).  Medication Reconciliation: complete     Carmen Isaac MA    "

## 2017-11-23 ENCOUNTER — TRANSFERRED RECORDS (OUTPATIENT)
Dept: HEALTH INFORMATION MANAGEMENT | Facility: CLINIC | Age: 47
End: 2017-11-23

## 2018-04-28 ENCOUNTER — OFFICE VISIT (OUTPATIENT)
Dept: URGENT CARE | Facility: URGENT CARE | Age: 48
End: 2018-04-28
Payer: COMMERCIAL

## 2018-04-28 VITALS
SYSTOLIC BLOOD PRESSURE: 125 MMHG | OXYGEN SATURATION: 97 % | BODY MASS INDEX: 33.27 KG/M2 | DIASTOLIC BLOOD PRESSURE: 85 MMHG | WEIGHT: 206.13 LBS | HEART RATE: 100 BPM | TEMPERATURE: 98.4 F

## 2018-04-28 DIAGNOSIS — J01.90 ACUTE SINUSITIS WITH SYMPTOMS > 10 DAYS: Primary | ICD-10-CM

## 2018-04-28 DIAGNOSIS — T36.95XA ANTIBIOTIC-INDUCED YEAST INFECTION: ICD-10-CM

## 2018-04-28 DIAGNOSIS — B37.9 ANTIBIOTIC-INDUCED YEAST INFECTION: ICD-10-CM

## 2018-04-28 DIAGNOSIS — Z79.01 LONG TERM CURRENT USE OF ANTICOAGULANT THERAPY: ICD-10-CM

## 2018-04-28 DIAGNOSIS — H61.23 BILATERAL IMPACTED CERUMEN: ICD-10-CM

## 2018-04-28 DIAGNOSIS — R07.0 THROAT PAIN: ICD-10-CM

## 2018-04-28 LAB
DEPRECATED S PYO AG THROAT QL EIA: NORMAL
SPECIMEN SOURCE: NORMAL

## 2018-04-28 PROCEDURE — 99213 OFFICE O/P EST LOW 20 MIN: CPT | Performed by: FAMILY MEDICINE

## 2018-04-28 PROCEDURE — 87081 CULTURE SCREEN ONLY: CPT | Performed by: FAMILY MEDICINE

## 2018-04-28 PROCEDURE — 87880 STREP A ASSAY W/OPTIC: CPT | Performed by: FAMILY MEDICINE

## 2018-04-28 RX ORDER — ONDANSETRON 4 MG/1
4 TABLET, ORALLY DISINTEGRATING ORAL
COMMUNITY
Start: 2015-10-25 | End: 2023-06-06

## 2018-04-28 RX ORDER — CEFDINIR 300 MG/1
300 CAPSULE ORAL 2 TIMES DAILY
Qty: 20 CAPSULE | Refills: 0 | Status: SHIPPED | OUTPATIENT
Start: 2018-04-28 | End: 2018-04-28

## 2018-04-28 RX ORDER — HYDROXYZINE HYDROCHLORIDE 25 MG/1
TABLET, FILM COATED ORAL
Refills: 0 | COMMUNITY
Start: 2017-09-13 | End: 2023-06-06

## 2018-04-28 RX ORDER — FLUCONAZOLE 150 MG/1
150 TABLET ORAL ONCE
Qty: 1 TABLET | Refills: 0 | Status: SHIPPED | OUTPATIENT
Start: 2018-04-28 | End: 2018-04-28

## 2018-04-28 RX ORDER — CEFDINIR 300 MG/1
300 CAPSULE ORAL 2 TIMES DAILY
Qty: 20 CAPSULE | Refills: 0 | Status: SHIPPED | OUTPATIENT
Start: 2018-04-28 | End: 2019-09-26

## 2018-04-28 RX ORDER — ONDANSETRON 8 MG/1
8 TABLET, ORALLY DISINTEGRATING ORAL
COMMUNITY
Start: 2017-07-07 | End: 2023-06-06

## 2018-04-28 RX ORDER — WARFARIN SODIUM 1 MG/1
TABLET ORAL
Refills: 1 | COMMUNITY
Start: 2017-08-15 | End: 2023-06-01

## 2018-04-28 RX ORDER — WARFARIN SODIUM 5 MG/1
TABLET ORAL
Refills: 2 | COMMUNITY
Start: 2017-08-15 | End: 2023-06-01

## 2018-04-28 RX ORDER — FLUCONAZOLE 150 MG/1
150 TABLET ORAL ONCE
Qty: 1 TABLET | Refills: 0 | Status: SHIPPED | OUTPATIENT
Start: 2018-04-28 | End: 2019-01-30

## 2018-04-28 RX ORDER — HYDROCODONE BITARTRATE AND ACETAMINOPHEN 5; 325 MG/1; MG/1
1-2 TABLET ORAL
COMMUNITY
Start: 2017-07-07 | End: 2023-06-06

## 2018-04-28 NOTE — MR AVS SNAPSHOT
After Visit Summary   4/28/2018    Gilosn Mcconnell    MRN: [id number]           Patient Information     Date Of Birth          1970        Visit Information        Provider Department      4/28/2018 11:00 AM Josee David MD [facility]        Today's Diagnoses     Acute sinusitis with symptoms > 10 days    -  1    Throat pain        Long term current use of anticoagulant therapy        Antibiotic-induced yeast infection          Care Instructions      Sinusitis (Antibiotic Treatment)    The sinuses are air-filled spaces within the bones of the face. They connect to the inside of the nose. Sinusitis is an inflammation of the tissue that lines the sinuses. Sinusitis can occur during a cold. It can also happen due to allergies to pollens and other particles in the air. Sinusitis can cause symptoms of sinus congestion and a feeling of fullness. A sinus infection causes fever, headache, and facial pain. There is often green or yellow fluid draining from the nose or into the back of the throat (post-nasal drip). You have been given antibiotics to treat this condition.  Home care    Take the full course of antibiotics as instructed. Do not stop taking them, even when you feel better.    Drink plenty of water, hot tea, and other liquids. This may help thin nasal mucus. It also may help your sinuses drain fluids.    Heat may help soothe painful areas of your face. Use a towel soaked in hot water. Or,  the shower and direct the warm spray onto your face. Using a vaporizer along with a menthol rub at night may also help soothe symptoms.     An expectorant with guaifenesin may help thin nasal mucus and help your sinuses drain fluids.    You can use an over-the-counter decongestant, unless a similar medicine was prescribed to you. Nasal sprays work the fastest. Use one that contains phenylephrine or oxymetazoline. First blow your nose gently. Then use the spray. Do not use  these medicines more often than directed on the label. If you do, your symptoms may get worse. You may also take pills that contain pseudoephedrine. Don t use products that combine multiple medicines. This is because side effects may be increased. Read labels. You can also ask the pharmacist for help. (People with high blood pressure should not use decongestants. They can raise blood pressure.)    Over-the-counter antihistamines may help if allergies contributed to your sinusitis.      Do not use nasal rinses or irrigation during an acute sinus infection, unless your healthcare provider tells you to. Rinsing may spread the infection to other areas in your sinuses.    Use acetaminophen or ibuprofen to control pain, unless another pain medicine was prescribed to you. If you have chronic liver or kidney disease or ever had a stomach ulcer, talk with your healthcare provider before using these medicines. (Aspirin should never be taken by anyone under age 18 who is ill with a fever. It may cause severe liver damage.)    Don't smoke. This can make symptoms worse.  Follow-up care  Follow up with your healthcare provider or our staff if you are better in 1 week.  When to seek medical advice  Call your healthcare provider if any of these occur:    Facial pain or headache that gets worse    Stiff neck    Unusual drowsiness or confusion    Swelling of your forehead or eyelids    Vision problems, such as blurred or double vision    Fever of 100.4 F (38 C) or higher, or as directed by your healthcare provider    Seizure    Breathing problems    Symptoms don't go away in 10 days  Prevention  Here are steps you can take to help prevent an infection:    Keep good hand washing habits.    Don t have close contact with people who have sore throats, colds, or other upper respiratory infections.    Don t smoke, and stay away from secondhand smoke.    Stay up to date with of your vaccines.  Date Last Reviewed: 11/1/2017 2000-2017 The  "[employer]. [address] [place] [place] [address]. All rights reserved. This information is not intended as a substitute for professional medical care. Always follow your healthcare professional's instructions.                Follow-ups after your visit        Follow-up notes from your care team     Return if symptoms worsen or fail to improve.      Who to contact     If you have questions or need follow up information about today's clinic visit or your schedule please contact [facility] directly at [phone].  Normal or non-critical lab and imaging results will be communicated to you by OpenWherehart, letter or phone within 4 business days after the clinic has received the results. If you do not hear from us within 7 days, please contact the clinic through OpenWherehart or phone. If you have a critical or abnormal lab result, we will notify you by phone as soon as possible.  Submit refill requests through mBlox or call your pharmacy and they will forward the refill request to us. Please allow 3 business days for your refill to be completed.          Additional Information About Your Visit        OpenWherehart Information     mBlox lets you send messages to your doctor, view your test results, renew your prescriptions, schedule appointments and more. To sign up, go to [email or web address] . Click on \"Log in\" on the left side of the screen, which will take you to the Welcome page. Then click on \"Sign up Now\" on the right side of the page.     You will be asked to enter the access code listed below, as well as some personal information. Please follow the directions to create your username and password.     Your access code is: [email or web address]  Expires: 2018 11:47 AM     Your access code will  in 90 days. If you need help or a new code, please call your [facility]) or [phone].        Care EveryWhere ID     This is your Care EveryWhere ID. This could be used by other " organizations to access your [place] medical records  [id number]        Your Vitals Were     Pulse Temperature Pulse Oximetry Breastfeeding? BMI (Body Mass Index)       100 98.4  F (36.9  C) (Oral) 97% No 33.27 kg/m2        Blood Pressure from Last 3 Encounters:   04/28/18 125/85   09/18/17 128/80   06/22/17 120/88    Weight from Last 3 Encounters:   04/28/18 206 lb 2 oz (93.5 kg)   09/18/17 211 lb 6.4 oz (95.9 kg)   06/22/17 206 lb 6.4 oz (93.6 kg)              We Performed the Following     Beta strep group A culture     Strep, Rapid Screen          Today's Medication Changes          These changes are accurate as of 4/28/18 11:47 AM.  If you have any questions, ask your nurse or doctor.               Start taking these medicines.        Dose/Directions    cefdinir 300 MG capsule   Commonly known as:  OMNICEF   Used for:  Acute sinusitis with symptoms > 10 days   Started by:  Josee David MD        Dose:  300 mg   Take 1 capsule (300 mg) by mouth 2 times daily for 10 days   Quantity:  20 capsule   Refills:  0       fluconazole 150 MG tablet   Commonly known as:  DIFLUCAN   Used for:  Antibiotic-induced yeast infection   Started by:  Josee David MD        Dose:  150 mg   Take 1 tablet (150 mg) by mouth once for 1 dose   Quantity:  1 tablet   Refills:  0            Where to get your medicines      These medications were sent to [facility] - [address]  [address], [place] [address]     Phone:  [phone]     cefdinir 300 MG capsule    fluconazole 150 MG tablet               Information about OPIOIDS     PRESCRIPTION OPIOIDS: WHAT YOU NEED TO KNOW   You have a prescription for an opioid (narcotic) pain medicine. Opioids can cause addiction. If you have a history of chemical dependency of any type, you are at a higher risk of becoming addicted to opioids. Only take this medicine after all other options have been tried. Take it for as short  a time and as few doses as possible.     Do not:    Drive. If you drive while taking these medicines, you could be arrested for driving under the influence (DUI).    Operate heavy machinery    Do any other dangerous activities while taking these medicines.     Drink any alcohol while taking these medicines.      Take with any other medicines that contain acetaminophen. Read all labels carefully. Look for the word  acetaminophen  or  Tylenol.  Ask your pharmacist if you have questions or are unsure.    Store your pills in a secure place, locked if possible. We will not replace any lost or stolen medicine. If you don t finish your medicine, please throw away (dispose) as directed by your pharmacist. The [place] Pollution Control Agency has more information about safe disposal: https://www.pca.[facility].[place].us/living-green/managing-unwanted-medications    All opioids tend to cause constipation. Drink plenty of water and eat foods that have a lot of fiber, such as fruits, vegetables, prune juice, apple juice and high-fiber cereal. Take a laxative (Miralax, milk of magnesia, Colace, Senna) if you don t move your bowels at least every other day.          Primary Care Provider Office Phone # Fax #    Blanca Watson [phone] [address] [address]        Equal Access to Services     MARTHA MATSON AH: Hadii mario ku hadasho Soomaali, waaxda luqadaha, qaybta kaalmada adeegyada, krishna strong. So [facility] [phone].    ATENCIÓN: Si habla español, tiene a bentley disposición servicios gratuitos de asistencia lingüística. Llame al [phone].    We comply with applicable federal civil rights laws and [place] laws. We do not discriminate on the basis of race, color, national origin, age, disability, sex, sexual orientation, or gender identity.            Thank you!     Thank you for choosing [facility]  for your care. Our goal is always to provide  you with excellent care. Hearing back from our patients is one way we can continue to improve our services. Please take a few minutes to complete the written survey that you may receive in the mail after your visit with us. Thank you!             Your Updated Medication List - Protect others around you: Learn how to safely use, store and throw away your medicines at www.disposemymeds.org.          This list is accurate as of 4/28/18 11:47 AM.  Always use your most recent med list.                   Brand Name Dispense Instructions for use Diagnosis    aspirin 81 MG EC tablet      Take 81 mg by mouth daily.        buPROPion 300 MG 24 hr tablet    BUDEPRION XL    90 tablet    Take 1 tablet (300 mg) by mouth every morning    Major depressive disorder, recurrent episode, mild (H)       cefdinir 300 MG capsule    OMNICEF    20 capsule    Take 1 capsule (300 mg) by mouth 2 times daily for 10 days    Acute sinusitis with symptoms > 10 days       dexmethylphenidate 30 MG Cp24    FOCALIN XR    30 capsule    Take 1 tablet by mouth daily    Major depressive disorder, recurrent episode, mild (H)       doxepin 10 MG/ML (HIGH CONC) solution    SINEquan     3-6 ml daily        fluconazole 150 MG tablet    DIFLUCAN    1 tablet    Take 1 tablet (150 mg) by mouth once for 1 dose    Antibiotic-induced yeast infection       fluticasone 50 MCG/ACT spray    FLONASE    1 Bottle    Spray 1-2 sprays into both nostrils daily    Acute sinusitis with symptoms > 10 days       HYDROcodone-acetaminophen 5-325 MG per tablet    NORCO     Take 1-2 tablets by mouth        hydrOXYzine 25 MG tablet    ATARAX     TK 3 TO 4 TS PO QHS PRN        lamoTRIgine 200 MG tablet    LAMICTAL    90 tablet    Take 1 tablet (200 mg) by mouth daily (total 250 mg daily)    Major depressive disorder, recurrent episode, mild (H)       lidocaine (viscous) 2 % solution    XYLOCAINE     15 mLs        MIRENA (52 MG) 20 MCG/24HR IUD   Generic drug:  levonorgestrel      1 each  by Intrauterine route once.        * ondansetron 4 MG ODT tab    ZOFRAN-ODT     Place 4 mg under the tongue        * ondansetron 8 MG ODT tab    ZOFRAN-ODT     Place 8 mg under the tongue        PLAVIX 75 MG tablet   Generic drug:  clopidogrel      Take 75 mg by mouth daily.        STATIN NOT PRESCRIBED (INTENTIONAL)     0 each    Statin not prescribed intentionally due to LDL at goal        triamcinolone 0.1 % cream    KENALOG    45 g    Apply to affected area sparingly twice daily as needed    Rash       venlafaxine 150 MG 24 hr capsule    EFFEXOR-XR    90 capsule    Take 1 capsule (150 mg) by mouth daily    Major depressive disorder, recurrent episode, mild (H)       * warfarin 3 MG tablet    COUMADIN     Take 7 mg by mouth daily        * warfarin 1 MG tablet    COUMADIN     TK 1 T PO QOD WITH A 5 MG T D        * warfarin 5 MG tablet    COUMADIN     TK 1 T PO D ALONG WITH A 1 MG T QOD        * Notice:  This list has 5 medication(s) that are the same as other medications prescribed for you. Read the directions carefully, and ask your doctor or other care provider to review them with you.

## 2018-04-28 NOTE — PATIENT INSTRUCTIONS

## 2018-04-28 NOTE — PROGRESS NOTES
ENT Symptoms             Symptoms: cc Present Absent Comment   Fever/Chills   x    Fatigue  x     Muscle Aches   x    Eye Irritation   x    Sneezing  x     Nasal Jose/Drg  x     Sinus Pressure/Pain  x     Loss of smell  x     Dental pain  x     Sore Throat  x  Has pain on swallowing, concerned about possible strep   Swollen Glands   x    Ear Pain/Fullness   x    Cough   x    Wheeze   x    Chest Pain   x    Shortness of breath   x    Rash   x    Other   x On long term coumadin therapy     Symptom duration:  12 days, worsening over the past 5 days   Symptom severity:  moderate   Treatments tried:  Flonase   Contacts:  None       Reports a history of recurrent sinus infections.   Reports that she gets yeast infections when on antibiotics.         Patient Active Problem List   Diagnosis     Medication monitoring encounter     History of stroke, recurrent ,      Seizure disorder (H)     Mild major depression (H)     CARDIOVASCULAR SCREENING; LDL GOAL LESS THAN 100     APS (antiphospholipid syndrome) (H)     S/P splenectomy     History of pulmonary embolism     Obesity     Hemiparesis affecting left side as late effect of cerebrovascular accident (H)     S/P patent foramen ovale closure     Long term current use of anticoagulant therapy     Cervical high risk HPV (human papillomavirus) test positive     Dermatitis of ear canal     Family history of diabetes mellitus     Malpositioned intrauterine device, initial encounter     Past Surgical History:   Procedure Laterality Date      SECTION      FTP     HEART CATH PFO CLOSURE  2007    PFO closure     REPAIR TONGUE LACER,<2.6CM  2012    [facility]     SPLENECTOMY  1999       Social History   Substance Use Topics     Smoking status: Former Smoker     Packs/day: 0.50     Years: 8.00     Types: Cigarettes     Quit date: 2005     Smokeless tobacco: Never Used     Alcohol use No     Family History   Problem Relation Age of Onset     DIABETES  Father      Hypertension Father      CEREBROVASCULAR DISEASE Father      d.      C.A.D. Father 52     d. MI     Obesity Father      CANCER No family hx of          Current Outpatient Prescriptions   Medication Sig Dispense Refill     aspirin 81 MG EC tablet Take 81 mg by mouth daily.       buPROPion (BUDEPRION XL) 300 MG 24 hr tablet Take 1 tablet (300 mg) by mouth every morning 90 tablet 0     clopidogrel (PLAVIX) 75 MG tablet Take 75 mg by mouth daily.       dexmethylphenidate (FOCALIN XR) 30 MG CP24 Take 1 tablet by mouth daily 30 capsule 0     doxepin (SINEQUAN) 10 MG/ML (HIGH CONC) solution 3-6 ml daily       fluticasone (FLONASE) 50 MCG/ACT spray Spray 1-2 sprays into both nostrils daily 1 Bottle 1     HYDROcodone-acetaminophen (NORCO) 5-325 MG per tablet Take 1-2 tablets by mouth       hydrOXYzine (ATARAX) 25 MG tablet TK 3 TO 4 TS PO QHS PRN  0     lamoTRIgine (LAMICTAL) 200 MG tablet Take 1 tablet (200 mg) by mouth daily (total 250 mg daily) 90 tablet 0     levonorgestrel (MIRENA) 20 MCG/24HR IUD 1 each by Intrauterine route once.       lidocaine, viscous, (XYLOCAINE) 2 % solution 15 mLs       ondansetron (ZOFRAN-ODT) 4 MG ODT tab Place 4 mg under the tongue       ondansetron (ZOFRAN-ODT) 8 MG ODT tab Place 8 mg under the tongue       STATIN NOT PRESCRIBED, INTENTIONAL, Statin not prescribed intentionally due to LDL at goal 0 each      triamcinolone (KENALOG) 0.1 % cream Apply to affected area sparingly twice daily as needed (Patient not taking: Reported on 4/28/2018) 45 g 1     venlafaxine (EFFEXOR-XR) 150 MG 24 hr capsule Take 1 capsule (150 mg) by mouth daily 90 capsule 0     warfarin (COUMADIN) 1 MG tablet TK 1 T PO QOD WITH A 5 MG T D  1     warfarin (COUMADIN) 3 MG tablet Take 7 mg by mouth daily        warfarin (COUMADIN) 5 MG tablet TK 1 T PO D ALONG WITH A 1 MG T QOD  2     Allergies   Allergen Reactions     Augmentin Nausea and Vomiting     Gets really sick and cannot keep it down per patient        ROS:  Constitutional, HEENT, cardiovascular, pulmonary, gi and gu systems are negative, except as otherwise noted.    OBJECTIVE:     /85 (BP Location: Left arm, Patient Position: Chair, Cuff Size: Adult Large)  Pulse 100  Temp 98.4  F (36.9  C) (Oral)  Wt 206 lb 2 oz (93.5 kg)  SpO2 97%  Breastfeeding? No  BMI 33.27 kg/m2  Body mass index is 33.27 kg/(m^2).  GENERAL: healthy, alert and no distress  HENT: Bilateral cerumen impaction, unable to visualize TMs. Bilateral nasal turbinate hypertrophy with bilateral maxillary sinus tenderness. Oropharynx clear without any tonsillar enlargement or exudates.  RESP: lungs clear to auscultation - no rales, rhonchi or wheezes  CV: regular rate and rhythm, normal S1 S2, no S3 or S4, no murmur, click or rub, no peripheral edema and peripheral pulses strong  PSYCH: mentation appears normal, affect normal/bright    Diagnostic Test Results:  Reviewed and discussed with patient prior to discharge.  Results for orders placed or performed in visit on 04/28/18   Strep, Rapid Screen   Result Value Ref Range    Specimen Description Throat     Rapid Strep A Screen       NEGATIVE: No Group A streptococcal antigen detected by immunoassay, await culture report.         ASSESSMENT/PLAN:     Gilson was seen today for uri.    Diagnoses and all orders for this visit:    Acute sinusitis with symptoms > 10 days  Allergic to Augmentin  Drug-drug interaction of Doxycyline with Coumadin  States that she has responded well to Cefdnir in the past.   -     cefdinir (OMNICEF) 300 MG capsule; Take 1 capsule (300 mg) by mouth 2 times daily for 10 days    Throat pain  -     Strep, Rapid Screen  -     Beta strep group A culture  Throat lozenges or sprays (such as Chloraseptic) help reduce pain. Gargling with warm salt water will also reduce throat pain. Dissolve 1/2 teaspoon of salt in 1 glass of warm water. This may be useful just before meals.    Long term current use of anticoagulant  therapy  Drug-drug interaction of Doxycyline with Coumadin    History of Antibiotic-induced yeast infection  -   Delayed Rx:   fluconazole (DIFLUCAN) 150 MG tablet; Take 1 tablet (150 mg) by mouth once for 1 dose      Bilateral impacted cerumen  Recommended to use OTC mineral oil/Debrox.  Schedule an ancillary visit in 1 week for an ear recheck/lavage.       Patient education and Handout given       Follow up if symptoms fail to improve or worsen.      The patient was in agreement with the plan today and had no questions or concerns prior to leaving the clinic.        Josee David MD  [facility]

## 2018-04-29 LAB
BACTERIA SPEC CULT: NORMAL
SPECIMEN SOURCE: NORMAL

## 2018-05-08 ENCOUNTER — TRANSFERRED RECORDS (OUTPATIENT)
Dept: HEALTH INFORMATION MANAGEMENT | Facility: CLINIC | Age: 48
End: 2018-05-08

## 2019-01-30 ENCOUNTER — OFFICE VISIT (OUTPATIENT)
Dept: FAMILY MEDICINE | Facility: CLINIC | Age: 49
End: 2019-01-30
Payer: COMMERCIAL

## 2019-01-30 VITALS
BODY MASS INDEX: 34.86 KG/M2 | DIASTOLIC BLOOD PRESSURE: 70 MMHG | RESPIRATION RATE: 16 BRPM | WEIGHT: 216 LBS | TEMPERATURE: 97.2 F | OXYGEN SATURATION: 99 % | HEART RATE: 100 BPM | SYSTOLIC BLOOD PRESSURE: 100 MMHG

## 2019-01-30 DIAGNOSIS — Z12.31 VISIT FOR SCREENING MAMMOGRAM: ICD-10-CM

## 2019-01-30 DIAGNOSIS — N76.0 ACUTE VAGINITIS: ICD-10-CM

## 2019-01-30 DIAGNOSIS — J32.9 SINUSITIS, UNSPECIFIED CHRONICITY, UNSPECIFIED LOCATION: Primary | ICD-10-CM

## 2019-01-30 DIAGNOSIS — Z79.01 LONG TERM CURRENT USE OF ANTICOAGULANT THERAPY: ICD-10-CM

## 2019-01-30 PROCEDURE — 99214 OFFICE O/P EST MOD 30 MIN: CPT | Performed by: FAMILY MEDICINE

## 2019-01-30 RX ORDER — FLUCONAZOLE 150 MG/1
150 TABLET ORAL ONCE
Qty: 1 TABLET | Refills: 0 | Status: SHIPPED | OUTPATIENT
Start: 2019-01-30 | End: 2019-01-30

## 2019-01-30 RX ORDER — CEFUROXIME AXETIL 250 MG/1
250 TABLET ORAL 2 TIMES DAILY
Qty: 20 TABLET | Refills: 0 | Status: SHIPPED | OUTPATIENT
Start: 2019-01-30 | End: 2019-02-09

## 2019-01-30 NOTE — PROGRESS NOTES
SUBJECTIVE:   Gilson Mcconnell is a 48 year old female who presents to clinic today for the following health issues:        Acute Illness   Acute illness concerns: sinus   Onset: 2 week    Fever: YES    Chills/Sweats: YES- sweat    Headache (location?): YES    Sinus Pressure:YES    Conjunctivitis:  no    Ear Pain: YES- both    Rhinorrhea: no     Congestion: YES    Sore Throat: no     Cough: YES    Wheeze: no    Decreased Appetite: no    Nausea: YES    Vomiting: no    Diarrhea:  no    Dysuria/Freq.: no    Fatigue/Achiness: YES    Sick/Strep Exposure: no     Therapies Tried and outcome: tyleno cough/cold, flonase    Pt is on anticoagulants-see epic      Problem list and histories reviewed & adjusted, as indicated.  Additional history: as documented    Patient Active Problem List   Diagnosis     Medication monitoring encounter     History of stroke, recurrent ,      Seizure disorder (H)     Mild major depression (H)     CARDIOVASCULAR SCREENING; LDL GOAL LESS THAN 100     APS (antiphospholipid syndrome) (H)     S/P splenectomy     History of pulmonary embolism     Obesity     Hemiparesis affecting left side as late effect of cerebrovascular accident (H)     S/P patent foramen ovale closure     Long term current use of anticoagulant therapy     Cervical high risk HPV (human papillomavirus) test positive     Dermatitis of ear canal     Family history of diabetes mellitus     Malpositioned intrauterine device, initial encounter     Past Surgical History:   Procedure Laterality Date      SECTION      FTP     HEART CATH PFO CLOSURE  2007    PFO closure     REPAIR TONGUE LACER,<2.6CM  2012    [facility]     SPLENECTOMY  1999       Social History     Tobacco Use     Smoking status: Former Smoker     Packs/day: 0.50     Years: 8.00     Pack years: 4.00     Types: Cigarettes     Last attempt to quit: 2005     Years since quittin.0     Smokeless tobacco: Never Used   Substance Use Topics      Alcohol use: No     Family History   Problem Relation Age of Onset     Diabetes Father      Hypertension Father      Cerebrovascular Disease Father         d.      C.A.D. Father 52        d. MI     Obesity Father      Cancer No family hx of          Current Outpatient Medications   Medication Sig Dispense Refill     aspirin 81 MG EC tablet Take 81 mg by mouth daily.       buPROPion (BUDEPRION XL) 300 MG 24 hr tablet Take 1 tablet (300 mg) by mouth every morning 90 tablet 0     cefuroxime (CEFTIN) 250 MG tablet Take 1 tablet (250 mg) by mouth 2 times daily for 10 days 20 tablet 0     clopidogrel (PLAVIX) 75 MG tablet Take 75 mg by mouth daily.       dexmethylphenidate (FOCALIN XR) 30 MG CP24 Take 1 tablet by mouth daily 30 capsule 0     doxepin (SINEQUAN) 10 MG/ML (HIGH CONC) solution 3-6 ml daily       fluconazole (DIFLUCAN) 150 MG tablet Take 1 tablet (150 mg) by mouth once for 1 dose 1 tablet 0     fluticasone (FLONASE) 50 MCG/ACT spray Spray 1-2 sprays into both nostrils daily 1 Bottle 1     HYDROcodone-acetaminophen (NORCO) 5-325 MG per tablet Take 1-2 tablets by mouth       hydrOXYzine (ATARAX) 25 MG tablet TK 3 TO 4 TS PO QHS PRN  0     lamoTRIgine (LAMICTAL) 200 MG tablet Take 1 tablet (200 mg) by mouth daily (total 250 mg daily) 90 tablet 0     levonorgestrel (MIRENA) 20 MCG/24HR IUD 1 each by Intrauterine route once.       lidocaine, viscous, (XYLOCAINE) 2 % solution 15 mLs       ondansetron (ZOFRAN-ODT) 4 MG ODT tab Place 4 mg under the tongue       STATIN NOT PRESCRIBED, INTENTIONAL, Statin not prescribed intentionally due to LDL at goal 0 each      triamcinolone (KENALOG) 0.1 % cream Apply to affected area sparingly twice daily as needed 45 g 1     venlafaxine (EFFEXOR-XR) 150 MG 24 hr capsule Take 1 capsule (150 mg) by mouth daily 90 capsule 0     warfarin (COUMADIN) 1 MG tablet TK 1 T PO QOD WITH A 5 MG T D  1     warfarin (COUMADIN) 3 MG tablet Take 7 mg by mouth daily        warfarin (COUMADIN) 5  MG tablet TK 1 T PO D ALONG WITH A 1 MG T QOD  2     cefdinir (OMNICEF) 300 MG capsule Take 1 capsule (300 mg) by mouth 2 times daily 20 capsule 0     ondansetron (ZOFRAN-ODT) 8 MG ODT tab Place 8 mg under the tongue       Allergies   Allergen Reactions     Augmentin Nausea and Vomiting     Gets really sick and cannot keep it down per patient     Recent Labs   Lab Test 06/01/15  1227 03/31/14  1111   LDL 99 88   HDL 43* 42*   TRIG 149 140   CR 0.96  --    GFRESTIMATED 63  --    GFRESTBLACK 77  --    POTASSIUM 4.1  --    TSH 1.38  --       BP Readings from Last 3 Encounters:   01/30/19 100/70   04/28/18 125/85   09/18/17 128/80    Wt Readings from Last 3 Encounters:   01/30/19 98 kg (216 lb)   04/28/18 93.5 kg (206 lb 2 oz)   09/18/17 95.9 kg (211 lb 6.4 oz)                  Labs reviewed in EPIC    Reviewed and updated as needed this visit by clinical staff  Tobacco       Reviewed and updated as needed this visit by Provider         ROS:  CONSTITUTIONAL: NEGATIVE for fever, chills, change in weight  INTEGUMENTARY/SKIN: NEGATIVE for worrisome rashes, moles or lesions  ENT/MOUTH: as above  RESP:as above,no sob  CV: NEGATIVE for chest pain, palpitations or peripheral edema  GI: NEGATIVE for nausea, abdominal pain, heartburn, or change in bowel habits  MUSCULOSKELETAL: NEGATIVE for significant arthralgias or myalgia    OBJECTIVE:     /70   Pulse 100   Temp 97.2  F (36.2  C)   Resp 16   Wt 98 kg (216 lb)   SpO2 99%   BMI 34.86 kg/m    Body mass index is 34.86 kg/m .  GENERAL: healthy, alert and no distress  EYES: Eyes grossly normal to inspection, PERRL and conjunctivae and sclerae normal  HENT: ear canals and TM's normal, nose and mouth without ulcers or lesions  NECK: no adenopathy, no asymmetry, masses, or scars and thyroid normal to palpation  RESP: lungs clear to auscultation - no rales, rhonchi or wheezes  CV: regular rate and rhythm, normal S1 S2, no S3 or S4, no murmur, click or rub, no peripheral  edema and peripheral pulses strong  ABDOMEN: soft, nontender, no hepatosplenomegaly, no masses and bowel sounds normal  MS: no gross musculoskeletal defects noted, no edema    Diagnostic Test Results:  none     ASSESSMENT/PLAN:         1. Sinusitis, unspecified chronicity, unspecified location  SEE [facility] care orders  The potential side effects of this medication have been discussed with the patient.  Call if any significant problems with these are experienced.  Follow up 1 week if not better/sooner if worse    - cefuroxime (CEFTIN) 250 MG tablet; Take 1 tablet (250 mg) by mouth 2 times daily for 10 days  Dispense: 20 tablet; Refill: 0    2. Acute vaginitis  Pt gets yeast infection with antibiotics  - fluconazole (DIFLUCAN) 150 MG tablet; Take 1 tablet (150 mg) by mouth once for 1 dose  Dispense: 1 tablet; Refill: 0    3. Visit for screening mammogram  Advised   - MA SCREENING DIGITAL BILAT - Future  (s+30); Future    4. Long term current use of anticoagulant therapy  Advised Get Factor 10 done in 2-3 days as she is on antibiotics  This is followed By Hematology      Susana Kaur MD  [facility]

## 2019-01-30 NOTE — PATIENT INSTRUCTIONS
[facility]    If you have any questions regarding to your visit please contact your care team:       Team Red:   Clinic Hours Telephone Number   Dr. Blanca Randolph, NP 7am-7pm  Monday - Thursday   7am-5pm  Fridays  [phone]  (Appointment scheduling available 24/7)   Urgent Care - [place] and [facility] - 11am-9pm Monday-Friday Saturday-Sunday- 9am-5pm   [place] - 5pm-9pm Monday-Friday Saturday-Sunday- 9am-5pm  [phone] - [place]  [phone] - [place]       What options do I have for a visit other than an office visit? We offer electronic visits (e-visits) and telephone visits, when medically appropriate.  Please check with your medical insurance to see if these types of visits are covered, as you will be responsible for any charges that are not paid by your insurance.      You can use [employer]aker (secure electronic communication) to access to your chart, send your primary care provider a message, or make an appointment. Ask a team member how to get started.     For a price quote for your services, please call our Consumer Price Line at [phone] or our Imaging Cost estimation line at [phone] (for imaging tests).

## 2019-07-05 ENCOUNTER — TRANSFERRED RECORDS (OUTPATIENT)
Dept: HEALTH INFORMATION MANAGEMENT | Facility: CLINIC | Age: 49
End: 2019-07-05

## 2019-07-11 ENCOUNTER — TRANSFERRED RECORDS (OUTPATIENT)
Dept: HEALTH INFORMATION MANAGEMENT | Facility: CLINIC | Age: 49
End: 2019-07-11

## 2019-07-30 ENCOUNTER — TRANSFERRED RECORDS (OUTPATIENT)
Dept: HEALTH INFORMATION MANAGEMENT | Facility: CLINIC | Age: 49
End: 2019-07-30

## 2019-09-26 ENCOUNTER — OFFICE VISIT (OUTPATIENT)
Dept: FAMILY MEDICINE | Facility: CLINIC | Age: 49
End: 2019-09-26
Payer: COMMERCIAL

## 2019-09-26 VITALS
BODY MASS INDEX: 32.78 KG/M2 | OXYGEN SATURATION: 98 % | TEMPERATURE: 97.6 F | HEART RATE: 75 BPM | HEIGHT: 66 IN | DIASTOLIC BLOOD PRESSURE: 79 MMHG | WEIGHT: 204 LBS | SYSTOLIC BLOOD PRESSURE: 118 MMHG

## 2019-09-26 DIAGNOSIS — R30.0 DYSURIA: Primary | ICD-10-CM

## 2019-09-26 DIAGNOSIS — Z79.01 LONG TERM CURRENT USE OF ANTICOAGULANT THERAPY: ICD-10-CM

## 2019-09-26 DIAGNOSIS — B37.31 YEAST VAGINITIS: ICD-10-CM

## 2019-09-26 DIAGNOSIS — R82.90 NONSPECIFIC FINDING ON EXAMINATION OF URINE: ICD-10-CM

## 2019-09-26 DIAGNOSIS — Z23 NEED FOR PROPHYLACTIC VACCINATION AND INOCULATION AGAINST INFLUENZA: ICD-10-CM

## 2019-09-26 LAB
ALBUMIN UR-MCNC: 100 MG/DL
APPEARANCE UR: CLEAR
BILIRUB UR QL STRIP: NEGATIVE
COLOR UR AUTO: YELLOW
GLUCOSE UR STRIP-MCNC: NEGATIVE MG/DL
HGB UR QL STRIP: ABNORMAL
KETONES UR STRIP-MCNC: NEGATIVE MG/DL
LEUKOCYTE ESTERASE UR QL STRIP: ABNORMAL
NITRATE UR QL: NEGATIVE
PH UR STRIP: 5.5 PH (ref 5–7)
RBC #/AREA URNS AUTO: >100 /HPF
SOURCE: ABNORMAL
SP GR UR STRIP: >1.03 (ref 1–1.03)
UROBILINOGEN UR STRIP-ACNC: 0.2 EU/DL (ref 0.2–1)
WBC #/AREA URNS AUTO: ABNORMAL /HPF

## 2019-09-26 PROCEDURE — 81001 URINALYSIS AUTO W/SCOPE: CPT | Performed by: FAMILY MEDICINE

## 2019-09-26 PROCEDURE — 87086 URINE CULTURE/COLONY COUNT: CPT | Performed by: FAMILY MEDICINE

## 2019-09-26 PROCEDURE — G0008 ADMIN INFLUENZA VIRUS VAC: HCPCS | Performed by: FAMILY MEDICINE

## 2019-09-26 PROCEDURE — 99214 OFFICE O/P EST MOD 30 MIN: CPT | Mod: 25 | Performed by: FAMILY MEDICINE

## 2019-09-26 PROCEDURE — 90686 IIV4 VACC NO PRSV 0.5 ML IM: CPT | Performed by: FAMILY MEDICINE

## 2019-09-26 RX ORDER — FLUCONAZOLE 150 MG/1
150 TABLET ORAL ONCE
Qty: 1 TABLET | Refills: 0 | Status: SHIPPED | OUTPATIENT
Start: 2019-09-26 | End: 2019-09-26

## 2019-09-26 RX ORDER — NITROFURANTOIN 25; 75 MG/1; MG/1
100 CAPSULE ORAL 2 TIMES DAILY
Qty: 14 CAPSULE | Refills: 0 | Status: SHIPPED | OUTPATIENT
Start: 2019-09-26 | End: 2019-10-03

## 2019-09-26 ASSESSMENT — MIFFLIN-ST. JEOR: SCORE: 1578.09

## 2019-09-26 NOTE — LETTER
September 30, 2019    Gilson S [facility]  [address] [address]            Dear Gilson,    Your urine did not grow any specific infection   If persistent symptoms recommend being seen     Below is a copy of the results.  It was a pleasure to see you at your last appointment.    If you have any questions or concerns, please call myself or my nurse at [phone].    Sincerely,    Angela Ramirez MD /sidra    Results for orders placed or performed in visit on 09/26/19   UA reflex to Microscopic and Culture   Result Value Ref Range    Color Urine Yellow     Appearance Urine Clear     Glucose Urine Negative NEG^Negative mg/dL    Bilirubin Urine Negative NEG^Negative    Ketones Urine Negative NEG^Negative mg/dL    Specific Gravity Urine >1.030 1.003 - 1.035    Blood Urine Large (A) NEG^Negative    pH Urine 5.5 5.0 - 7.0 pH    Protein Albumin Urine 100 (A) NEG^Negative mg/dL    Urobilinogen Urine 0.2 0.2 - 1.0 EU/dL    Nitrite Urine Negative NEG^Negative    Leukocyte Esterase Urine Small (A) NEG^Negative    Source Midstream Urine    Urine Microscopic   Result Value Ref Range    WBC Urine  (A) OTO5^0 - 5 /HPF    RBC Urine >100 (A) OTO2^O - 2 /HPF   Urine Culture Aerobic Bacterial   Result Value Ref Range    Specimen Description Midstream Urine     Culture Micro       10,000 to 50,000 colonies/mL  mixed urogenital chepe

## 2019-09-26 NOTE — PROGRESS NOTES
"Chief complaint: dysuria    Denies any possibility of pregnancy declined a pregnancy test    Patient on coumadin - had history of recurrent strokes with PFO and also had a history of clotting disorder.    No thoughts of harming self or others  Feels safe at home    For the past 6 days has had increased frequency and urgency   Occasionally dysuria  Vaginal discharge or concerns: No  Fever chills: None  Nausea vomiting: None  Flank Pain: None  Patient denies possibility of pregnancy  Denies possibility of STD, declined STD testing.    Problem list and histories reviewed & adjusted, as indicated.  Additional history: as documented    Problem list, Medication list, Allergies, and Medical/Social/Surgical histories reviewed in [facility] and updated as appropriate.    ROS:  Constitutional, HEENT, cardiovascular, pulmonary, gi and gu systems are negative, except as otherwise noted.    OBJECTIVE:                                                    /79   Pulse 75   Temp 97.6  F (36.4  C) (Oral)   Ht 1.67 m (5' 5.75\")   Wt 92.5 kg (204 lb)   SpO2 98%   Breastfeeding? No   BMI 33.18 kg/m    Body mass index is 33.18 kg/m .  GENERAL: healthy, alert and no distress  NECK: no adenopathy, no asymmetry, masses, or scars and thyroid normal to palpation  RESP: lungs clear to auscultation - no rales, rhonchi or wheezes  CV: regular rate and rhythm, normal S1 S2, no S3 or S4, no murmur, click or rub, no peripheral edema and peripheral pulses strong  ABDOMEN: soft, nontender, no hepatosplenomegaly, no masses and bowel sounds normal  MS: no gross musculoskeletal defects noted, no edema    Diagnostic Test Results:  Results for orders placed or performed in visit on 09/26/19 (from the past 24 hour(s))   UA reflex to Microscopic and Culture   Result Value Ref Range    Color Urine Yellow     Appearance Urine Clear     Glucose Urine Negative NEG^Negative mg/dL    Bilirubin Urine Negative NEG^Negative    Ketones Urine Negative " NEG^Negative mg/dL    Specific Gravity Urine >1.030 1.003 - 1.035    Blood Urine Large (A) NEG^Negative    pH Urine 5.5 5.0 - 7.0 pH    Protein Albumin Urine 100 (A) NEG^Negative mg/dL    Urobilinogen Urine 0.2 0.2 - 1.0 EU/dL    Nitrite Urine Negative NEG^Negative    Leukocyte Esterase Urine Small (A) NEG^Negative    Source Midstream Urine         Diagnostic Test Results:  Results for orders placed or performed in visit on 09/26/19 (from the past 24 hour(s))   UA reflex to Microscopic and Culture   Result Value Ref Range    Color Urine Yellow     Appearance Urine Clear     Glucose Urine Negative NEG^Negative mg/dL    Bilirubin Urine Negative NEG^Negative    Ketones Urine Negative NEG^Negative mg/dL    Specific Gravity Urine >1.030 1.003 - 1.035    Blood Urine Large (A) NEG^Negative    pH Urine 5.5 5.0 - 7.0 pH    Protein Albumin Urine 100 (A) NEG^Negative mg/dL    Urobilinogen Urine 0.2 0.2 - 1.0 EU/dL    Nitrite Urine Negative NEG^Negative    Leukocyte Esterase Urine Small (A) NEG^Negative    Source Midstream Urine    Urine Microscopic   Result Value Ref Range    WBC Urine  (A) OTO5^0 - 5 /HPF    RBC Urine >100 (A) OTO2^O - 2 /HPF         ASSESSMENT/PLAN:                                                        ICD-10-CM    1. Dysuria R30.0 UA reflex to Microscopic and Culture     Urine Microscopic     Urine Culture Aerobic Bacterial     nitroFURantoin macrocrystal-monohydrate (MACROBID) 100 MG capsule   2. Need for prophylactic vaccination and inoculation against influenza Z23 INFLUENZA VACCINE IM > 6 MONTHS VALENT IIV4 [06527]     Vaccine Administration, Initial [86482]   3. Yeast vaginitis B37.3 fluconazole (DIFLUCAN) 150 MG tablet   4. Nonspecific finding on examination of urine R82.90    5. Long term current use of anticoagulant therapy Z79.01      Prescribed with macrobid  Patient requested prescription for fluconazole as needed if she develops a yeast vaginitis. She says that she gets a yeast infection  with antibiotics and monistat usually doesn't work. Aware that Fluconazole does have side effects that were discussed, aware of concerns for liver function and bone marrow suppression. Aware contraindicated if pregnant or breastfeeding. Advised to try monistat first then if not improved may use one dose of Fluconazole. But if persist, recommend being seen.     PATIENT AWARE FLUCONAZOLE WILL INTERFERE WITH HER WARFARIN  ADVISED PATIENT TO TRY MONISTAT FIRST OR TO CONSULT WITH HER HEMATOLOGIST (WHO MANAGES HER COUMADIN) PRIOR TO TAKING THE DOSE    Microscopic hematuria - patient on her period  Aware to go to ER or come in immediately if with any fever chills nausea vomiting or flank pain.  Adverse reactions of medications discussed.  Over the counter medications discussed.   Aware to come back in if with worsening symptoms or if no relief despite treatment plan  Patient voiced understanding and had no further questions.     MD Angela Willis MD  [facility]

## 2019-09-27 LAB
BACTERIA SPEC CULT: NORMAL
SPECIMEN SOURCE: NORMAL

## 2021-03-10 ENCOUNTER — TRANSFERRED RECORDS (OUTPATIENT)
Dept: HEALTH INFORMATION MANAGEMENT | Facility: CLINIC | Age: 51
End: 2021-03-10

## 2021-06-03 ENCOUNTER — RECORDS - HEALTHEAST (OUTPATIENT)
Dept: ADMINISTRATIVE | Facility: CLINIC | Age: 51
End: 2021-06-03

## 2022-05-18 ENCOUNTER — TRANSFERRED RECORDS (OUTPATIENT)
Dept: HEALTH INFORMATION MANAGEMENT | Facility: CLINIC | Age: 52
End: 2022-05-18
Payer: COMMERCIAL

## 2022-09-09 ENCOUNTER — OFFICE VISIT (OUTPATIENT)
Dept: URGENT CARE | Facility: URGENT CARE | Age: 52
End: 2022-09-09
Payer: COMMERCIAL

## 2022-09-09 VITALS
SYSTOLIC BLOOD PRESSURE: 158 MMHG | WEIGHT: 204 LBS | HEART RATE: 70 BPM | OXYGEN SATURATION: 98 % | TEMPERATURE: 98.4 F | DIASTOLIC BLOOD PRESSURE: 88 MMHG | BODY MASS INDEX: 33.18 KG/M2 | RESPIRATION RATE: 20 BRPM

## 2022-09-09 DIAGNOSIS — M79.661 PAIN AND SWELLING OF RIGHT LOWER LEG: Primary | ICD-10-CM

## 2022-09-09 DIAGNOSIS — M79.89 PAIN AND SWELLING OF RIGHT LOWER LEG: Primary | ICD-10-CM

## 2022-09-09 DIAGNOSIS — D68.61 APS (ANTIPHOSPHOLIPID SYNDROME) (H): ICD-10-CM

## 2022-09-09 DIAGNOSIS — Z86.73 HISTORY OF STROKE: ICD-10-CM

## 2022-09-09 DIAGNOSIS — Z91.148 NONCOMPLIANCE WITH MEDICATION REGIMEN: ICD-10-CM

## 2022-09-09 PROCEDURE — 99205 OFFICE O/P NEW HI 60 MIN: CPT | Mod: 25 | Performed by: EMERGENCY MEDICINE

## 2022-09-09 PROCEDURE — 96372 THER/PROPH/DIAG INJ SC/IM: CPT | Performed by: EMERGENCY MEDICINE

## 2022-09-09 RX ORDER — KETOROLAC TROMETHAMINE 30 MG/ML
15 INJECTION, SOLUTION INTRAMUSCULAR; INTRAVENOUS ONCE
Status: COMPLETED | OUTPATIENT
Start: 2022-09-09 | End: 2022-09-09

## 2022-09-09 RX ORDER — RIVAROXABAN 20 MG/1
1 TABLET, FILM COATED ORAL DAILY
COMMUNITY
Start: 2022-05-05 | End: 2023-06-06

## 2022-09-09 RX ADMIN — KETOROLAC TROMETHAMINE 15 MG: 30 INJECTION, SOLUTION INTRAMUSCULAR; INTRAVENOUS at 10:38

## 2022-09-09 NOTE — PROGRESS NOTES
Assessment & Plan     Diagnosis:    (M79.661,  M79.89) Pain and swelling of right lower leg  (primary encounter diagnosis)  (D68.61) APS (antiphospholipid syndrome) (H)    (Z86.73) History of stroke, recurrent 2007, 2008    (Z91.14) Noncompliance with medication regimen      Medical Decision Making:  Gilson Mcconnell is a 51 year old female with complex past medical history including antiphospholipid syndrome, 3x CVAs with left-sided hemiparesis issues who presents for evaluation of right leg swelling and pain at the knee.  Patient states that she has a history of DVT and PE, has not been taking her Xarelto regularly; maybe once or twice over the past week.     On exam, she has tenderness and swelling at the right knee, popliteal fossa and mild swelling in the lower extremity compared with left.  States that she is unable to ambulate now due to pain, denies any trauma.    She has full passive/active range of motion and I am less concerned for septic joint or gout, but this is certainly on the differential.     Given the patient's severe distress and history of blood clots in the setting of medication non-compliance (states she has difficulty paying for the Xarelto), patient was directed to go to the [employer] [facility] now for further evaluation.  Unfortunately, after contacting the [facility] the patient has [employer] insurance is unable to go to [employer] system.  Patient was therefore directed to go to the ER now for further evaluation. Patient without concerns for PE at this time; oxygen is 98% on room air here.  She is hemodynamically stable. Patient verbalizes understanding and agreement with the plan. All questions answered.       Henrik Szymanski PA-C  [facility]    Subjective     Gilson Mcconnell is a 51 year old female who presents to clinic today for the following health issues:  Chief Complaint   Patient presents with     Knee Pain     Rt knee - can not walk on it , extreme swelling- pt denies  "trauma.  Can barely stand on it.        HPI  Patient is a report last week she is been experiencing right knee pain and swelling, states that she is having difficulty walking now because of it.  She states that it particularly hurts behind the knee; she does have range of motion at the knee.  She denies any trauma or known injuries.  She does have a history of antiphospholipid syndrome, DVT, PE and CVA; states that she \"not good about taking my Xarelto.\"  States that she has taken it maybe 1-2 times over the past week.  She denies any chest pain, shortness of breath, lightheadedness, dizziness, abdominal pain, nausea, vomiting, fevers, IVDU or other concerns.    Review of Systems    See HPI    Objective      Vitals: BP (!) 158/88   Pulse 70   Temp 98.4  F (36.9  C) (Tympanic)   Wt 92.5 kg (204 lb)   SpO2 98%   BMI 33.18 kg/m    Resp: 18    Patient Vitals for the past 24 hrs:   BP Temp Temp src Pulse SpO2 Weight   09/09/22 1009 (!) 158/88 98.4  F (36.9  C) Tympanic 70 98 % 92.5 kg (204 lb)       Vital signs reviewed by: Henrik Szymanski PA-C    Physical Exam   Constitutional: Patient is alert and cooperative. Moderate/Severe acute distress.  Head: Atraumatic.  No raccoon eyes or tripp signs.  Ears: External ear canals are normal.  Mouth: Mucous membranes are moist. Normal tongue and tonsil. Posterior oropharynx is clear.  Eyes: Conjunctivae, EOMI and lids are normal. PERRL.  Cardiovascular: Regular rate and rhythm. DP/PT pulses 2+ in the RLE.  Pulmonary/Chest: Lungs are clear to auscultation throughout. Effort normal. No respiratory distress. No wheezes, rales or rhonchi.  GI: Abdomen is soft and non-tender throughout.  Neurological: Alert and oriented x3. CN 2-12 intact.   MSK: Right knee with swelling, slight erythema and tenderness to palpation in the popliteal fossa.  Scant lower extremity edema compared with left.  Passive full RENA at the knee elicits minor pain.  Normal range of motion of the hip and " ankle.  No lymphangitis, streaking, fluctuance or areas of pointing.  Compartments are soft throughout the leg.   Skin: No rash noted on visualized skin.  Psychiatric:The patient has a normal mood and affect.       Interventions:    Toradol 15mg IM    Henrik Szymanski PA-C, September 9, 2022

## 2023-05-22 ENCOUNTER — TRANSFERRED RECORDS (OUTPATIENT)
Dept: HEALTH INFORMATION MANAGEMENT | Facility: CLINIC | Age: 53
End: 2023-05-22
Payer: COMMERCIAL

## 2023-06-01 PROBLEM — L25.9 CONTACT DERMATITIS AND ECZEMA: Status: ACTIVE | Noted: 2023-06-01

## 2023-06-01 RX ORDER — PANTOPRAZOLE SODIUM 40 MG/1
40 TABLET, DELAYED RELEASE ORAL
COMMUNITY
Start: 2023-05-17 | End: 2023-07-17

## 2023-06-01 RX ORDER — AMLODIPINE BESYLATE 5 MG/1
1 TABLET ORAL DAILY
COMMUNITY
Start: 2023-05-18 | End: 2023-06-06

## 2023-06-01 RX ORDER — ENOXAPARIN SODIUM 100 MG/ML
80 INJECTION SUBCUTANEOUS EVERY 12 HOURS
COMMUNITY
Start: 2023-05-17 | End: 2023-06-06

## 2023-06-06 ENCOUNTER — OFFICE VISIT (OUTPATIENT)
Dept: FAMILY MEDICINE | Facility: CLINIC | Age: 53
End: 2023-06-06
Payer: COMMERCIAL

## 2023-06-06 ENCOUNTER — LAB (OUTPATIENT)
Dept: FAMILY MEDICINE | Facility: CLINIC | Age: 53
End: 2023-06-06

## 2023-06-06 VITALS
DIASTOLIC BLOOD PRESSURE: 83 MMHG | TEMPERATURE: 98.2 F | BODY MASS INDEX: 35.99 KG/M2 | HEIGHT: 65 IN | HEART RATE: 103 BPM | SYSTOLIC BLOOD PRESSURE: 119 MMHG | OXYGEN SATURATION: 96 % | WEIGHT: 216 LBS | RESPIRATION RATE: 16 BRPM

## 2023-06-06 DIAGNOSIS — Z12.11 SCREEN FOR COLON CANCER: ICD-10-CM

## 2023-06-06 DIAGNOSIS — G40.909 SEIZURE DISORDER (H): ICD-10-CM

## 2023-06-06 DIAGNOSIS — E66.01 MORBID OBESITY (H): ICD-10-CM

## 2023-06-06 DIAGNOSIS — Z12.31 VISIT FOR SCREENING MAMMOGRAM: ICD-10-CM

## 2023-06-06 DIAGNOSIS — Z23 NEED FOR SHINGLES VACCINE: ICD-10-CM

## 2023-06-06 DIAGNOSIS — D69.3 IDIOPATHIC THROMBOCYTOPENIC PURPURA (H): ICD-10-CM

## 2023-06-06 DIAGNOSIS — Z23 NEED FOR DIPHTHERIA-TETANUS-PERTUSSIS (TDAP) VACCINE: ICD-10-CM

## 2023-06-06 DIAGNOSIS — I26.99 ACUTE PULMONARY EMBOLISM WITHOUT ACUTE COR PULMONALE, UNSPECIFIED PULMONARY EMBOLISM TYPE (H): Primary | ICD-10-CM

## 2023-06-06 DIAGNOSIS — D68.61 APS (ANTIPHOSPHOLIPID SYNDROME) (H): ICD-10-CM

## 2023-06-06 DIAGNOSIS — I10 HYPERTENSION GOAL BP (BLOOD PRESSURE) < 140/90: ICD-10-CM

## 2023-06-06 DIAGNOSIS — I69.354 HEMIPARESIS AFFECTING LEFT SIDE AS LATE EFFECT OF CEREBROVASCULAR ACCIDENT (H): ICD-10-CM

## 2023-06-06 DIAGNOSIS — F33.9 RECURRENT MAJOR DEPRESSIVE DISORDER, REMISSION STATUS UNSPECIFIED (H): ICD-10-CM

## 2023-06-06 PROBLEM — L25.9 CONTACT DERMATITIS AND ECZEMA: Status: RESOLVED | Noted: 2023-06-01 | Resolved: 2023-06-06

## 2023-06-06 PROCEDURE — 99214 OFFICE O/P EST MOD 30 MIN: CPT | Performed by: FAMILY MEDICINE

## 2023-06-06 RX ORDER — DOXEPIN HYDROCHLORIDE 10 MG/1
10 CAPSULE ORAL
Qty: 30 CAPSULE | Refills: 1 | Status: SHIPPED | OUTPATIENT
Start: 2023-06-06 | End: 2023-07-17

## 2023-06-06 RX ORDER — WARFARIN SODIUM 5 MG/1
5 TABLET ORAL DAILY
COMMUNITY
End: 2024-06-26

## 2023-06-06 RX ORDER — AMLODIPINE BESYLATE 10 MG/1
1 TABLET ORAL
COMMUNITY
Start: 2023-05-30 | End: 2023-06-06

## 2023-06-06 RX ORDER — WARFARIN SODIUM 1 MG/1
1 TABLET ORAL DAILY
COMMUNITY

## 2023-06-06 RX ORDER — PREDNISONE 10 MG/1
5 TABLET ORAL
COMMUNITY
Start: 2023-05-22 | End: 2023-06-06

## 2023-06-06 RX ORDER — AMLODIPINE BESYLATE 10 MG/1
10 TABLET ORAL DAILY
Qty: 90 TABLET | Refills: 3 | Status: SHIPPED | OUTPATIENT
Start: 2023-06-06 | End: 2024-04-29

## 2023-06-06 ASSESSMENT — PATIENT HEALTH QUESTIONNAIRE - PHQ9
SUM OF ALL RESPONSES TO PHQ QUESTIONS 1-9: 10
SUM OF ALL RESPONSES TO PHQ QUESTIONS 1-9: 10
10. IF YOU CHECKED OFF ANY PROBLEMS, HOW DIFFICULT HAVE THESE PROBLEMS MADE IT FOR YOU TO DO YOUR WORK, TAKE CARE OF THINGS AT HOME, OR GET ALONG WITH OTHER PEOPLE: NOT DIFFICULT AT ALL

## 2023-06-06 NOTE — PATIENT INSTRUCTIONS
You are eligible for the following vaccines:   Pneumococcal vaccine (Prevnar 13)  Meningococcal vaccine  Hepatitis B vaccines  Shingrix vaccines  Tdap vaccine (tetanus booster)  COVID-18 booster

## 2023-06-06 NOTE — LETTER
September 21, 2023    Linda Mcconnell  [address] [address]          Dear ,    We are writing to inform you of your test results.    Your Cologuard test results are normal. This means you are at low risk for colon cancer. This routine screening test should be completed every 3 years until age 75.       Resulted Orders   COLOGUARD(EXACT SCIENCES)   Result Value Ref Range    COLOGUARD-ABSTRACT Negative Negative      Comment:        NEGATIVE TEST RESULT. A negative Cologuard result indicates a low likelihood that a colorectal cancer (CRC) or advanced adenoma (adenomatous polyps with more advanced pre-malignant features)  is present. The chance that a person with a negative Cologuard test has a colorectal cancer is less than 1 in 1500 (negative predictive value >99.9%) or has an  advanced adenoma is less than  5.3% (negative predictive value 94.7%). These data are based on a prospective cross-sectional study of 10,000 individuals at average risk for colorectal cancer who were screened with both Cologuard and colonoscopy. (Rosi Pugh al, N Engl J Med 2014;370(14):1046-9078) The normal value (reference range) for this assay is negative.    COLOGUARD RE-SCREENING RECOMMENDATION: Periodic colorectal cancer screening is an important part of preventive healthcare for asymptomatic individuals at average risk for colorectal cancer.  Following a negative Cologuard result, the American Cancer Society and U.S.  Multi-Society Task Force screening guidelines recommend a Cologuard re-screening interval of 3 years.   References: American Cancer Society Guideline for Colorectal Cancer Screening: https://www.cancer.org/cancer/colon-rectal-cancer/zmhbxybyx-fhkyrcxyb-pgqyrgi/acs-recommendations.html.; Ihsan HERNANDEZ, Marisa DC, Jennifer LR, Colorectal Cancer Screening: Recommendations for Physicians and Patients from the U.S. Multi-Society Task Force on Colorectal Cancer Screening , Am J Gastroenterology 2017;  112:1967-1235.    TEST DESCRIPTION: Composite algorithmic analysis of stool DNA-biomarkers with hemoglobin immunoassay.   Quantitative values of individual biomarkers are not reportable and are not associated with individual biomarker result reference ranges. Cologuard is intended for colorectal cancer screening of adults of either sex, 45 years or older, who are at average-risk for colorectal cancer (CRC). Cologuard has been approved for use by the U.S. FDA. The performance of Cologuard was  established in a cross sectional study of average-risk adults aged 50-84. Cologuard performance in patients ages 45 to 49 years was estimated by sub-group analysis of near-age groups. Colonoscopies performed for a positive result may find as the most clinically significant lesion: colorectal cancer [4.0%], advanced adenoma (including sessile serrated polyps greater than or equal to 1cm diameter) [20%] or non- advanced adenoma [31%]; or no colorectal neoplasia [45%]. These estimates are derived from a prospective cross-sectional screening study of 10,000 individuals at average risk for colorectal cancer who were screened with both Cologuard and colonoscopy. (Rosi PINEDA et al, N Engl J Med 2014;370(14):9228-1517.) Cologuard may produce a false negative or false positive result (no colorectal cancer or precancerous polyp present at colonoscopy follow up). A negative Cologuard test result does not guarantee the absence of CRC or advanced adenoma (pre-cancer). The current Cologuard  screening interval is every 3 years. (American Cancer Society and U.S. Multi-Society Task Force). Cologuard performance data in a 10,000 patient pivotal study using colonoscopy as the reference method can be accessed at the following location: www.StartX.com/results. Additional description of the Cologuard test process, warnings and precautions can be found at www.Syapserd.com.         If you have any questions or concerns, please call the clinic  at the number listed above.       Sincerely,      Blanca Watson MD

## 2023-06-06 NOTE — LETTER
My Depression Action Plan  Name: Gilson Mcconnell   Date of Birth 1970  Date: 6/6/2023    My doctor: Blanca Watson   My clinic: [facility]  [address]  [place] [address]  [phone]            GREEN    ZONE   Good Control    What it looks like:   Things are going generally well. You have normal ups and downs. You may even feel depressed from time to time, but bad moods usually last less than a day.   What you need to do:  Continue to care for yourself (see self care plan)  Check your depression survival kit and update it as needed  Follow your physician s recommendations including any medication.  Do not stop taking medication unless you consult with your physician first.             YELLOW         ZONE Getting Worse    What it looks like:   Depression is starting to interfere with your life.   It may be hard to get out of bed; you may be starting to isolate yourself from others.  Symptoms of depression are starting to last most all day and this has happened for several days.   You may have suicidal thoughts but they are not constant.   What you need to do:     Call your care team. Your response to treatment will improve if you keep your care team informed of your progress. Yellow periods are signs an adjustment may need to be made.     Continue your self-care.  Just get dressed and ready for the day.  Don't give yourself time to talk yourself out of it.    Talk to someone in your support network.    Open up your Depression Self-Care Plan/Wellness Kit.             RED    ZONE Medical Alert - Get Help    What it looks like:   Depression is seriously interfering with your life.   You may experience these or other symptoms: You can t get out of bed most days, can t work or engage in other necessary activities, you have trouble taking care of basic hygiene, or basic responsibilities, thoughts of suicide or death that will not go away, self-injurious  behavior.     What you need to do:  Call your care team and request a same-day appointment. If they are not available (weekends or after hours) call your local crisis line, emergency room or 911.          Depression Self-Care Plan / Wellness Kit    Many people find that medication and therapy are helpful treatments for managing depression. In addition, making small changes to your everyday life can help to boost your mood and improve your wellbeing. Below are some tips for you to consider. Be sure to talk with your medical provider and/or behavioral health consultant if your symptoms are worsening or not improving.     Sleep   Sleep hygiene  means all of the habits that support good, restful sleep. It includes maintaining a consistent bedtime and wake time, using your bedroom only for sleeping or sex, and keeping the bedroom dark and free of distractions like a computer, smartphone, or television.     Develop a Healthy Routine  Maintain good hygiene. Get out of bed in the morning, make your bed, brush your teeth, take a shower, and get dressed. Don t spend too much time viewing media that makes you feel stressed. Find time to relax each day.    Exercise  Get some form of exercise every day. This will help reduce pain and release endorphins, the  feel good  chemicals in your brain. It can be as simple as just going for a walk or doing some gardening, anything that will get you moving.      Diet  Strive to eat healthy foods, including fruits and vegetables. Drink plenty of water. Avoid excessive sugar, caffeine, alcohol, and other mood-altering substances.     Stay Connected with Others  Stay in touch with friends and family members.    Manage Your Mood  Try deep breathing, massage therapy, biofeedback, or meditation. Take part in fun activities when you can. Try to find something to smile about each day.     Psychotherapy  Be open to working with a therapist if your provider recommends it.     Medication  Be sure to  take your medication as prescribed. Most anti-depressants need to be taken every day. It usually takes several weeks for medications to work. Not all medicines work for all people. It is important to follow-up with your provider to make sure you have a treatment plan that is working for you. Do not stop your medication abruptly without first discussing it with your provider.    Crisis Resources   These hotlines are for both adults and children. They and are open 24 hours a day, 7 days a week unless noted otherwise.    National Suicide Prevention Lifeline   988 or 7-149-343-LHJA (8432)    Crisis Text Line    www.crisistextline.org  Text HOME to 339915 from anywhere in the [place], anytime, about any type of crisis. A live, trained crisis counselor will receive the text and respond quickly.    [facility] Lifeline for LGBTQ Youth  A national crisis intervention and suicide lifeline for LGBTQ youth under 25. Provides a safe place to talk without judgement. Call 1-465.849.3692; text START to 074353 or visit www.thetrevorproject.org to talk to a trained counselor.    For [facility] crisis numbers, visit the [facility] website at:  https://[place].gov/dhs/people-we-serve/adults/health-care/mental-health/resources/crisis-contacts.jsp

## 2023-06-06 NOTE — PROGRESS NOTES
"  Assessment & Plan     (I26.99) Acute pulmonary embolism without acute cor pulmonale, unspecified pulmonary embolism type (H)  (primary encounter diagnosis)  (D68.61) APS (antiphospholipid syndrome) (H)  (D69.3) Idiopathic thrombocytopenic purpura (H)  Plan: Continue chronic anticoagulation under surveillance of protime clinic with goal INR 2 - 3; follow-up with hematology for labs and follow-up as planned      (I69.354) Hemiparesis affecting left side as late effect of cerebrovascular accident (H)  (G40.909) Seizure disorder (H)  Comment: controlled   Plan: follow     (F33.9) Recurrent major depressive disorder, remission status unspecified (H)  Plan: doxepin (SINEQUAN) 10 MG capsule       Discussed risks and benefits of this medication for insomnia. Follow     (I10) Hypertension goal BP (blood pressure) < 140/90  (E66.01) Morbid obesity (H)  Comment: Well controlled with medications without side effects.   Plan: amLODIPine (NORVASC) 10 MG tablet        Counseled to make better food choices, exercise as tolerated, and lose weight.     (Z12.11) Screen for colon cancer  Plan: ALAINA([employer])          (Z12.31) Visit for screening mammogram  Plan: MA SCREENING DIGITAL BILAT - Future  (s+30)                      MED REC REQUIRED   Post Medication Reconciliation Status: discharge medications reconciled and changed, per note/orders  BMI:   Estimated body mass index is 35.56 kg/m  as calculated from the following:    Height as of this encounter: 1.66 m (5' 5.35\").    Weight as of this encounter: 98 kg (216 lb).   Weight management plan: Discussed healthy diet and exercise guidelines    See Patient Instructions    Blanca Watson MD  [facility] [place] Mc[place] is a 52 year old, presenting for the following health issues:  Hospital F/U ([facility], 5/14-5/17)        6/6/2023     9:05 AM   Additional Questions   Roomed by Alicja HONEYCUTT CMA     Forms 6/6/2023   Any forms needing to be completed Yes " "    HPI       Hospital Follow-up Visit:    Hospital/Nursing Home/IP Rehab Facility: [facility]  Date of Admission: 5/14/23  Date of Discharge: 5/17/23  Reason(s) for Admission: pulmonary embolism      Was your hospitalization related to COVID-19? No   Problems taking medications regularly:  None  Medication changes since discharge: None  Problems adhering to non-medication therapy:  None    Summary of hospitalization:  CareEverywhere information obtained and reviewed  Diagnostic Tests/Treatments reviewed.  Follow up needed: none  Other Healthcare Providers Involved in Patient s Care:         Specialist appointment - hematology  Update since discharge: improved.       Gilson Mcconnell is a 52 year old female who presents with hospital follow-up for recurrent PE. Symptom onset has been sudden, improving for a time period of weeks. Severity is described as none. Course of her symptoms over time is improving. She was not taking her DOAC as prescribed due to cost.     Plan of care communicated with patient                   Review of Systems   CONSTITUTIONAL:POSITIVE  for weight gain  INTEGUMENTARY/SKIN: NEGATIVE for worrisome rashes, moles or lesions  EYES: NEGATIVE for vision changes or irritation  ENT/MOUTH: NEGATIVE for ear, mouth and throat problems  RESP: NEGATIVE for significant cough or SOB  CV: lower extremity edema  GI: NEGATIVE for nausea, abdominal pain, heartburn, or change in bowel habits  : NEGATIVE for frequency, dysuria, or hematuria  MUSCULOSKELETAL: NEGATIVE for significant arthralgias or myalgia  NEURO: Hx CVA and Hx seizure disorder  ENDOCRINE: NEGATIVE for temperature intolerance, skin/hair changes  HEME/ALLERGY/IMMUNE: see HPI   PSYCHIATRIC: HX depression and insomnia        Objective    /83 (BP Location: Right arm, Patient Position: Sitting, Cuff Size: Adult Large)   Pulse 103   Temp 98.2  F (36.8  C) (Oral)   Resp 16   Ht 1.66 m (5' 5.35\")   Wt 98 kg (216 lb)   SpO2 96%   BMI 35.56 " kg/m    Body mass index is 35.56 kg/m .  Physical Exam   GENERAL: alert, no distress and obese  EYES: Eyes grossly normal to inspection, PERRL and conjunctivae and sclerae normal  NECK: no adenopathy, no asymmetry, masses, or scars and thyroid normal to palpation  RESP: lungs clear to auscultation - no rales, rhonchi or wheezes  CV: regular rate and rhythm, normal S1 S2, no S3 or S4, no murmur, click or rub, trace edema   MS: no deformity; normal gait   PSYCH: mentation appears normal, affect normal/bright    Office Visit on 09/26/2019   Component Date Value Ref Range Status     Color Urine 09/26/2019 Yellow   Final     Appearance Urine 09/26/2019 Clear   Final     Glucose Urine 09/26/2019 Negative  NEG^Negative mg/dL Final     Bilirubin Urine 09/26/2019 Negative  NEG^Negative Final     Ketones Urine 09/26/2019 Negative  NEG^Negative mg/dL Final     Specific Gravity Urine 09/26/2019 >1.030  1.003 - 1.035 Final     Blood Urine 09/26/2019 Large (A)  NEG^Negative Final     pH Urine 09/26/2019 5.5  5.0 - 7.0 pH Final     Protein Albumin Urine 09/26/2019 100 (A)  NEG^Negative mg/dL Final     Urobilinogen Urine 09/26/2019 0.2  0.2 - 1.0 EU/dL Final     Nitrite Urine 09/26/2019 Negative  NEG^Negative Final     Leukocyte Esterase Urine 09/26/2019 Small (A)  NEG^Negative Final     Source 09/26/2019 Midstream Urine   Final     WBC Urine 09/26/2019  (A)  OTO5^0 - 5 /HPF Final     RBC Urine 09/26/2019 >100 (A)  OTO2^O - 2 /HPF Final     Specimen Description 09/26/2019 Midstream Urine   Final     Culture Micro 09/26/2019    Final                    Value:10,000 to 50,000 colonies/mL  mixed urogenital chepe         No results found for this or any previous visit (from the past 24 hour(s)).                Answers for HPI/ROS submitted by the patient on 6/6/2023  If you checked off any problems, how difficult have these problems made it for you to do your work, take care of things at home, or get along with other people?: Not  difficult at all  PHQ9 TOTAL SCORE: 10

## 2023-06-07 ENCOUNTER — TELEPHONE (OUTPATIENT)
Dept: FAMILY MEDICINE | Facility: CLINIC | Age: 53
End: 2023-06-07
Payer: COMMERCIAL

## 2023-06-07 NOTE — TELEPHONE ENCOUNTER
Patient brought form to appointment 06/06/23 provider filled out patient told provider she will  brought to  for  and also faxed to [employer]  Doreen Al   [employer] Team        Forms/Letter Request    Type of form/letter: FMLA - Unknown    Have you been seen for this request: Yes     Do we have the form/letter: Yes:     Who is the form from? Insurance comp [employer]- [phone]    Where did/will the form come from? Patient or family brought in       When is form/letter needed by: soon    How would you like the form/letter returned:       Doreen Al   [employer] Team

## 2023-07-17 ENCOUNTER — OFFICE VISIT (OUTPATIENT)
Dept: FAMILY MEDICINE | Facility: CLINIC | Age: 53
End: 2023-07-17
Payer: COMMERCIAL

## 2023-07-17 VITALS
SYSTOLIC BLOOD PRESSURE: 120 MMHG | RESPIRATION RATE: 16 BRPM | HEART RATE: 82 BPM | HEIGHT: 65 IN | WEIGHT: 220 LBS | BODY MASS INDEX: 36.65 KG/M2 | OXYGEN SATURATION: 99 % | DIASTOLIC BLOOD PRESSURE: 86 MMHG | TEMPERATURE: 97.5 F

## 2023-07-17 DIAGNOSIS — F33.9 RECURRENT MAJOR DEPRESSIVE DISORDER, REMISSION STATUS UNSPECIFIED (H): ICD-10-CM

## 2023-07-17 DIAGNOSIS — E78.5 HYPERLIPIDEMIA LDL GOAL <70: ICD-10-CM

## 2023-07-17 DIAGNOSIS — G40.909 SEIZURE DISORDER (H): ICD-10-CM

## 2023-07-17 DIAGNOSIS — Z23 NEED FOR DIPHTHERIA-TETANUS-PERTUSSIS (TDAP) VACCINE: ICD-10-CM

## 2023-07-17 DIAGNOSIS — Z90.81 HISTORY OF SPLENECTOMY: ICD-10-CM

## 2023-07-17 DIAGNOSIS — I10 HYPERTENSION GOAL BP (BLOOD PRESSURE) < 140/90: ICD-10-CM

## 2023-07-17 DIAGNOSIS — Z23 NEED FOR SHINGLES VACCINE: ICD-10-CM

## 2023-07-17 DIAGNOSIS — D69.3 IDIOPATHIC THROMBOCYTOPENIC PURPURA (H): ICD-10-CM

## 2023-07-17 DIAGNOSIS — T83.32XA MALPOSITIONED INTRAUTERINE DEVICE, INITIAL ENCOUNTER: ICD-10-CM

## 2023-07-17 DIAGNOSIS — E66.01 MORBID OBESITY (H): ICD-10-CM

## 2023-07-17 DIAGNOSIS — D68.61 APS (ANTIPHOSPHOLIPID SYNDROME) (H): ICD-10-CM

## 2023-07-17 DIAGNOSIS — Z00.00 ENCOUNTER FOR MEDICARE ANNUAL WELLNESS EXAM: Primary | ICD-10-CM

## 2023-07-17 DIAGNOSIS — Z12.4 CERVICAL CANCER SCREENING: ICD-10-CM

## 2023-07-17 DIAGNOSIS — I69.354 HEMIPARESIS AFFECTING LEFT SIDE AS LATE EFFECT OF CEREBROVASCULAR ACCIDENT (H): ICD-10-CM

## 2023-07-17 LAB
CHOLEST SERPL-MCNC: 215 MG/DL
HDLC SERPL-MCNC: 43 MG/DL
LDLC SERPL CALC-MCNC: 144 MG/DL
NONHDLC SERPL-MCNC: 172 MG/DL
TRIGL SERPL-MCNC: 138 MG/DL

## 2023-07-17 PROCEDURE — G0145 SCR C/V CYTO,THINLAYER,RESCR: HCPCS | Performed by: FAMILY MEDICINE

## 2023-07-17 PROCEDURE — 80061 LIPID PANEL: CPT | Performed by: FAMILY MEDICINE

## 2023-07-17 PROCEDURE — 36415 COLL VENOUS BLD VENIPUNCTURE: CPT | Performed by: FAMILY MEDICINE

## 2023-07-17 PROCEDURE — 87624 HPV HI-RISK TYP POOLED RSLT: CPT | Performed by: FAMILY MEDICINE

## 2023-07-17 PROCEDURE — G0439 PPPS, SUBSEQ VISIT: HCPCS | Performed by: FAMILY MEDICINE

## 2023-07-17 RX ORDER — DOXEPIN HYDROCHLORIDE 10 MG/1
10 CAPSULE ORAL
Qty: 30 CAPSULE | Refills: 11 | Status: SHIPPED | OUTPATIENT
Start: 2023-07-17 | End: 2024-08-02

## 2023-07-17 ASSESSMENT — ENCOUNTER SYMPTOMS
ARTHRALGIAS: 1
HEADACHES: 0
FREQUENCY: 0
PALPITATIONS: 0
PARESTHESIAS: 0
MYALGIAS: 0
CONSTIPATION: 0
NERVOUS/ANXIOUS: 1
ABDOMINAL PAIN: 0
NAUSEA: 0
HEARTBURN: 0
BREAST MASS: 0
HEMATURIA: 0
JOINT SWELLING: 0
DIARRHEA: 0
WEAKNESS: 0
HEMATOCHEZIA: 0
COUGH: 0
EYE PAIN: 0
DIZZINESS: 0
SHORTNESS OF BREATH: 1
CHILLS: 0
FEVER: 0
SORE THROAT: 0
DYSURIA: 0

## 2023-07-17 ASSESSMENT — PATIENT HEALTH QUESTIONNAIRE - PHQ9
10. IF YOU CHECKED OFF ANY PROBLEMS, HOW DIFFICULT HAVE THESE PROBLEMS MADE IT FOR YOU TO DO YOUR WORK, TAKE CARE OF THINGS AT HOME, OR GET ALONG WITH OTHER PEOPLE: SOMEWHAT DIFFICULT
SUM OF ALL RESPONSES TO PHQ QUESTIONS 1-9: 6
SUM OF ALL RESPONSES TO PHQ QUESTIONS 1-9: 6

## 2023-07-17 ASSESSMENT — ACTIVITIES OF DAILY LIVING (ADL)
CURRENT_FUNCTION: HOUSEWORK REQUIRES ASSISTANCE
CURRENT_FUNCTION: LAUNDRY REQUIRES ASSISTANCE

## 2023-07-17 NOTE — PROGRESS NOTES
"SUBJECTIVE:   Linda is a 52 year old who presents for Preventive Visit.      7/17/2023     1:39 PM   Additional Questions   Roomed by Alicja HONEYCUTT CMA     Are you in the first 12 months of your Medicare coverage?  No    Patient has depression, recurrent, with no medication side effects and anhedonia or low mood, without suicidal ideation.  She is sleeping better with doxepin. She is followed by hematology for APS with Hx of stroke and PE.     Healthy Habits:     In general, how would you rate your overall health?  Fair    Frequency of exercise:  2-3 days/week    Duration of exercise:  Less than 15 minutes    Do you usually eat at least 4 servings of fruit and vegetables a day, include whole grains    & fiber and avoid regularly eating high fat or \"junk\" foods?  No    Taking medications regularly:  Yes    Medication side effects:  None    Ability to successfully perform activities of daily living:  Housework requires assistance and laundry requires assistance    Home Safety:  No safety concerns identified    Hearing Impairment:  Difficulty following a conversation in a noisy restaurant or crowded room and need to ask people to speak up or repeat themselves    In the past 6 months, have you been bothered by leaking of urine? Yes    In general, how would you rate your overall mental or emotional health?  Poor    Additional concerns today:  No        Have you ever done Advance Care Planning? (For example, a Health Directive, POLST, or a discussion with a medical provider or your loved ones about your wishes): No, advance care planning information given to patient to review.  Advanced care planning was discussed at today's visit.       Fall risk  Fallen 2 or more times in the past year?: No  Any fall with injury in the past year?: No    Cognitive Screening   1) Repeat 3 items (Leader, Season, Table)    2) Clock draw: NORMAL  3) 3 item recall: Recalls 3 objects  Results: 3 items recalled: COGNITIVE IMPAIRMENT LESS " LIKELY    Mini-CogTM Copyright CHRISTIE Rachel. Licensed by the author for use in [facility]; reprinted with permission (mary [email or web address]). All rights reserved.      Do you have sleep apnea, excessive snoring or daytime drowsiness?: yes    Reviewed and updated as needed this visit by clinical staff   Tobacco  Allergies  Meds  Problems  Med Hx  Surg Hx  Fam Hx          Reviewed and updated as needed this visit by Provider   Tobacco  Allergies  Meds  Problems  Med Hx  Surg Hx  Fam Hx         Social History     Tobacco Use     Smoking status: Former     Packs/day: 0.50     Years: 8.00     Pack years: 4.00     Types: Cigarettes     Quit date: 2005     Years since quittin.5     Smokeless tobacco: Never   Substance Use Topics     Alcohol use: No             2023     1:31 PM   Alcohol Use   Prescreen: >3 drinks/day or >7 drinks/week? No     Do you have a current opioid prescription? No  Do you use any other controlled substances or medications that are not prescribed by a provider? Cannabis     Current providers sharing in care for this patient include:   Patient Care Team:  Blanca Watson MD as PCP - General (Family Practice)  Teresa Lopez MD as MD (Oncology)  Brent Ruffin MD (Physiatry)  Blanca Watson MD as Assigned PCP    The following health maintenance items are reviewed in Epic and correct as of today:  Health Maintenance   Topic Date Due     HEPATITIS B IMMUNIZATION (1 of 3 - 3-dose series) Never done     MENINGITIS IMMUNIZATION (1 - Risk 2-dose series) Never done     COLORECTAL CANCER SCREENING  Never done     Pneumococcal Vaccine: Pediatrics (0 to 5 Years) and At-Risk Patients (6 to 64 Years) (3 - PPSV23 if available, else PCV20) 2015     LIPID  2016     MAMMO SCREENING  2017     COVID-19 Vaccine (2 - Booster for Pal series) 2021     HPV TEST  2021     PAP  2021     DTAP/TDAP/TD IMMUNIZATION (3 - Td or Tdap)  2022     ZOSTER IMMUNIZATION (1 of 2) 11/10/2022     INFLUENZA VACCINE (1) 2023     PHQ-9  2024     ANNUAL REVIEW OF HM ORDERS  2024     MEDICARE ANNUAL WELLNESS VISIT  2024     ADVANCE CARE PLANNING  2028     HEPATITIS C SCREENING  Completed     HIV SCREENING  Completed     DEPRESSION ACTION PLAN  Completed     IPV IMMUNIZATION  Aged Out     Patient Active Problem List   Diagnosis     History of stroke, recurrent ,      Seizure disorder (H)     Mild major depression (H)     Idiopathic thrombocytopenic purpura (H)     APS (antiphospholipid syndrome) (H)     History of splenectomy     History of pulmonary embolism     Hemiparesis affecting left side as late effect of cerebrovascular accident (H)     Long term current use of anticoagulant therapy     Cervical high risk HPV (human papillomavirus) test positive     Malpositioned intrauterine device, initial encounter     RAKESH (obstructive sleep apnea)     Morbid obesity (H)     Hypertension goal BP (blood pressure) < 140/90     Past Surgical History:   Procedure Laterality Date      SECTION      FTP     HEART CATH PFO CLOSURE  2007    PFO closure     REPAIR TONGUE LACER,<2.6CM  2012    [facility]     SPLENECTOMY  1999       Social History     Tobacco Use     Smoking status: Former     Packs/day: 0.50     Years: 8.00     Pack years: 4.00     Types: Cigarettes     Quit date: 2005     Years since quittin.5     Smokeless tobacco: Never   Substance Use Topics     Alcohol use: No     Family History   Problem Relation Age of Onset     Diabetes Father      Hypertension Father      Cerebrovascular Disease Father         d.      C.A.D. Father 52        d. MI     Obesity Father      Cancer No family hx of          Current Outpatient Medications   Medication Sig Dispense Refill     amLODIPine (NORVASC) 10 MG tablet Take 1 tablet (10 mg) by mouth daily 90 tablet 3     doxepin (SINEQUAN) 10 MG capsule Take 1  capsule (10 mg) by mouth nightly as needed for sleep 30 capsule 11     levonorgestrel (MIRENA) 20 MCG/24HR IUD 1 each by Intrauterine route once.       warfarin ANTICOAGULANT (COUMADIN) 1 MG tablet Take 1 mg by mouth daily Take with the 5 mg as directed       warfarin ANTICOAGULANT (COUMADIN) 5 MG tablet Take 5 mg by mouth daily       Tdap, tetanus-diptheria-acell pertussis, (BOOSTRIX) 5-2.5-18.5 LF-MCG/0.5 ASAEL injection Inject 0.5 mLs into the muscle once for 1 dose 0.5 mL 0     zoster vaccine recombinant adjuvanted (SHINGRIX) injection Inject 0.5 mLs into the muscle once for 1 dose Pharmacist administered 0.5 mL 0     Allergies   Allergen Reactions     Amoxicillin-Pot Clavulanate Nausea and Vomiting     Gets really sick and cannot keep it down per patient         FHS-7:        No data to display                 Mammogram Screening: Recommended annual mammography  Pertinent mammograms are reviewed under the imaging tab.    Review of Systems   Constitutional: Negative for chills and fever.   HENT: Positive for congestion. Negative for ear pain, hearing loss and sore throat.    Eyes: Negative for pain and visual disturbance.   Respiratory: Positive for shortness of breath. Negative for cough.    Cardiovascular: Negative for chest pain, palpitations and peripheral edema.   Gastrointestinal: Negative for abdominal pain, constipation, diarrhea, heartburn, hematochezia and nausea.   Breasts:  Negative for tenderness, breast mass and discharge.   Genitourinary: Negative for dysuria, frequency, genital sores, hematuria, pelvic pain, urgency, vaginal bleeding and vaginal discharge.   Musculoskeletal: Positive for arthralgias. Negative for joint swelling and myalgias.   Skin: Negative for rash.   Neurological: Negative for dizziness, weakness, headaches and paresthesias.   Psychiatric/Behavioral: Negative for mood changes. The patient is nervous/anxious.          OBJECTIVE:   /86 (BP Location: Left arm, Patient  "Position: Sitting, Cuff Size: Adult Large)   Pulse 82   Temp 97.5  F (36.4  C) (Oral)   Resp 16   Ht 1.66 m (5' 5.35\")   Wt 99.8 kg (220 lb)   SpO2 99%   BMI 36.22 kg/m   Estimated body mass index is 36.22 kg/m  as calculated from the following:    Height as of this encounter: 1.66 m (5' 5.35\").    Weight as of this encounter: 99.8 kg (220 lb).  Physical Exam  GENERAL: alert, no distress and obese  EYES: Eyes grossly normal to inspection, PERRL and conjunctivae and sclerae normal  HENT: ear canals and TM's normal, nose and mouth without ulcers or lesions  NECK: no adenopathy, no asymmetry, masses, or scars and thyroid normal to palpation  RESP: lungs clear to auscultation - no rales, rhonchi or wheezes  CV: regular rate and rhythm, normal S1 S2, no S3 or S4, no murmur, click or rub, no peripheral edema    ABDOMEN: soft, nontender, no hepatosplenomegaly, no masses and bowel sounds normal   (female): normal female external genitalia, normal urethral meatus, vaginal mucosa pink, moist, well rugated, and normal cervix/adnexa/uterus without masses or discharge, IUD strings are not visible   MS: no gross musculoskeletal defects noted, no edema  SKIN: no suspicious lesions or rashes  NEURO: Normal strength and tone, mentation intact and speech normal  PSYCH: mentation appears normal, affect normal/bright    Diagnostic Test Results:  Labs reviewed in Epic    ASSESSMENT / PLAN:   (Z00.00) Encounter for Medicare annual wellness exam  (primary encounter diagnosis)  Plan: Lipid panel reflex to direct LDL Non-fasting          (F33.9) Recurrent major depressive disorder, remission status unspecified (H)  Plan: doxepin (SINEQUAN) 10 MG capsule          (I10) Hypertension goal BP (blood pressure) < 140/90  (E66.01) Morbid obesity (H)  Plan: Lipid panel reflex to direct LDL Non-fasting     Well controlled with medications without side effects. Counseled to make better food choices, exercise as tolerated, and lose weight. "     (I69.354) Hemiparesis affecting left side as late effect of cerebrovascular accident (H)  (G40.909) Seizure disorder (H)  (D68.61) APS (antiphospholipid syndrome) (H)  (D69.3) Idiopathic thrombocytopenic purpura (H)  Comment: Well controlled with medications without side effects.   Plan: Continue chronic anticoagulation under surveillance of [facility] clinic; follow-up with hematology as planned      (T83.32XA) Malpositioned intrauterine device, initial encounter  Plan: Ob/Gyn Referral          (Z90.81) History of splenectomy  (Z23) Need for diphtheria-tetanus-pertussis (Tdap) vaccine  (Z23) Need for shingles vaccine  Plan: recommended routine vaccines, which she'd like to discuss with her hematologist             COUNSELING:  Reviewed preventive health counseling, as reflected in patient instructions  Special attention given to:       Regular exercise       Healthy diet/nutrition       Osteoporosis prevention/bone health       Colon cancer screening        She reports that she quit smoking about 18 years ago. Her smoking use included cigarettes. She has a 4.00 pack-year smoking history. She has never used smokeless tobacco.      Appropriate preventive services were discussed with this patient, including applicable screening as appropriate for cardiovascular disease, diabetes, osteopenia/osteoporosis, and glaucoma.  As appropriate for age/gender, discussed screening for colorectal cancer, prostate cancer, breast cancer, and cervical cancer. Checklist reviewing preventive services available has been given to the patient.    Reviewed patients plan of care and provided an AVS. The Basic Care Plan (routine screening as documented in Health Maintenance) for [place] meets the Care Plan requirement. This Care Plan has been established and reviewed with the Patient.          Blanca Watsno MD  [facility]    Identified Health Risks:    I have reviewed Opioid Use Disorder and Substance Use Disorder  risk factors and made any needed referrals.     Answers for HPI/ROS submitted by the patient on 7/17/2023  If you checked off any problems, how difficult have these problems made it for you to do your work, take care of things at home, or get along with other people?: Somewhat difficult  PHQ9 TOTAL SCORE: 6        The patient was provided with suggestions to help her develop a healthy physical lifestyle.  The patient was counseled and encouraged to consider modifying their diet and eating habits. She was provided with information on recommended healthy diet options.  The patient reports that she has difficulty with activities of daily living. I have asked that the patient make a follow up appointment    The patient was provided with written information regarding signs of hearing loss.  Information on urinary incontinence and treatment options given to patient.  The patient was provided with suggestions to help her develop a healthy emotional lifestyle.  The patient's PHQ-9 score is consistent with mild depression. She was provided with information regarding depression and was advised to schedule a follow up appointment

## 2023-07-17 NOTE — LETTER
July 18, 2023    Linda Mcconnell  [address] [address]          Dear ,    We are writing to inform you of your test results.    Your cholesterol is high. I recommend increasing exercise and eating at least 4 to 5 servings of fruits and vegetables daily. Let's consider a daily cholesterol lowering medication like generic Lipitor.     Return for lab only recheck in 6 to 8 weeks while fasting (nothing but water and medications for at least 8 hours.)  You may call [phone] or go to [email or web address].     Resulted Orders   Lipid panel reflex to direct LDL Non-fasting   Result Value Ref Range    Cholesterol 215 (H) <200 mg/dL    Triglycerides 138 <150 mg/dL    Direct Measure HDL 43 (L) >=50 mg/dL    LDL Cholesterol Calculated 144 (H) <=100 mg/dL    Non HDL Cholesterol 172 (H) <130 mg/dL    Narrative    Cholesterol  Desirable:  <200 mg/dL    Triglycerides  Normal:  Less than 150 mg/dL  Borderline High:  150-199 mg/dL  High:  200-499 mg/dL  Very High:  Greater than or equal to 500 mg/dL    Direct Measure HDL  Female:  Greater than or equal to 50 mg/dL   Male:  Greater than or equal to 40 mg/dL    LDL Cholesterol  Desirable:  <100mg/dL  Above Desirable:  100-129 mg/dL   Borderline High:  130-159 mg/dL   High:  160-189 mg/dL   Very High:  >= 190 mg/dL    Non HDL Cholesterol  Desirable:  130 mg/dL  Above Desirable:  130-159 mg/dL  Borderline High:  160-189 mg/dL  High:  190-219 mg/dL  Very High:  Greater than or equal to 220 mg/dL       If you have any questions or concerns, please call the clinic at the number listed above.       Sincerely,      Blanca Watson MD

## 2023-07-17 NOTE — PATIENT INSTRUCTIONS
Patient Education   Personalized Prevention Plan  You are due for the preventive services outlined below.  Your care team is available to assist you in scheduling these services.  If you have already completed any of these items, please share that information with your care team to update in your medical record.  Health Maintenance Due   Topic Date Due     Hepatitis B Vaccine (1 of 3 - 3-dose series) Never done     Meningitis A Vaccine (1 - Risk 2-dose series) Never done     Colorectal Cancer Screening  Never done     Pneumococcal Vaccine (3 - PPSV23 if available, else PCV20) 03/31/2015     Cholesterol Lab  06/01/2016     Mammogram  06/24/2017     COVID-19 Vaccine (2 - Booster for Pal series) 06/04/2021     HPV Screening  06/24/2021     PAP Smear  06/24/2021     Diptheria Tetanus Pertussis (DTAP/TDAP/TD) Vaccine (3 - Td or Tdap) 05/28/2022     Zoster (Shingles) Vaccine (1 of 2) 11/10/2022     Your Health Risk Assessment indicates you feel you are not in good health    A healthy lifestyle helps keep the body fit and the mind alert. It helps protect you from disease, helps you fight disease, and helps prevent chronic disease (disease that doesn't go away) from getting worse. This is important as you get older and begin to notice twinges in muscles and joints and a decline in the strength and stamina you once took for granted. A healthy lifestyle includes good healthcare, good nutrition, weight control, recreation, and regular exercise. Avoid harmful substances and do what you can to keep safe. Another part of a healthy lifestyle is stay mentally active and socially involved.    Good healthcare     Have a wellness visit every year.     If you have new symptoms, let us know right away. Don't wait until the next checkup.     Take medicines exactly as prescribed and keep your medicines in a safe place. Tell us if your medicine causes problems.   Healthy diet and weight control     Eat 3 or 4 small, nutritious,  low-fat, high-fiber meals a day. Include a variety of fruits, vegetables, and whole-grain foods.     Make sure you get enough calcium in your diet. Calcium, vitamin D, and exercise help prevent osteoporosis (bone thinning).     If you live alone, try eating with others when you can. That way you get a good meal and have company while you eat it.     Try to keep a healthy weight. If you eat more calories than your body uses for energy, it will be stored as fat and you will gain weight.     Recreation   Recreation is not limited to sports and team events. It includes any activity that provides relaxation, interest, enjoyment, and exercise. Recreation provides an outlet for physical, mental, and social energy. It can give a sense of worth and achievement. It can help you stay healthy.    Mental Exercise and Social Involvement  Mental and emotional health is as important as physical health. Keep in touch with friends and family. Stay as active as possible. Continue to learn and challenge yourself.   Things you can do to stay mentally active are:    Learn something new, like a foreign language or musical instrument.     Play SCRABBLE or do crossword puzzles. If you cannot find people to play these games with you at home, you can play them with others on your computer through the Internet.     Join a games club--anything from card games to chess or checkers or lawn bowling.     Start a new hobby.     Go back to school.     Volunteer.     Read.   Keep up with world events.    Understanding USDA MyPlate  The USDA has guidelines to help you make healthy food choices. These are called MyPlate. MyPlate shows the food groups that make up healthy meals using the image of a place setting. Before you eat, think about the healthiest choices for what to put on your plate or in your cup or bowl. To learn more about building a healthy plate, visit www.myplate.gov.     The food groups    Fruits. Any fruit or 100% fruit juice counts as  part of the Fruit Group. Fruits may be fresh, canned, frozen, or dried, and may be whole, cut-up, or pureed. Make 1/2 of your plate fruits and vegetables.    Vegetables. Any vegetable or 100% vegetable juice counts as a member of the Vegetable Group. Vegetables may be fresh, frozen, canned, or dried. They can be served raw or cooked and may be whole, cut-up, or mashed. Make 1/2 of your plate fruits and vegetables.    Grains. All foods made from grains are part of the Grains Group. These include wheat, rice, oats, cornmeal, and barley. Grains are often used to make foods such as bread, pasta, oatmeal, cereal, tortillas, and grits. Grains should be no more than 1/4 of your plate. At least half of your grains should be whole grains.    Protein. This group includes meat, poultry, seafood, beans and peas, eggs, processed soy products (such as tofu), nuts (including nut butters), and seeds. Make protein choices no more than 1/4 of your plate. Meat and poultry choices should be lean or low fat.    Dairy. The Dairy Group includes all fluid milk products and foods made from milk that contain calcium, such as yogurt and cheese. (Foods that have little calcium, such as cream, butter, and cream cheese, are not part of this group.) Most dairy choices should be low-fat or fat-free.  Things to limit  Eating healthy also means limiting these things in your diet:    Oils. Oils aren't a food group, but they do contain essential nutrients. These are fats that are liquid at room temperature. Including small amounts of oils in your diet is fine and can even be good for you. But it's important to watch how much oil you include in your diet. Oils include avocado, canola, corn, olive, soybean, vegetable, and sunflower. Foods that are mainly oil include mayonnaise, certain salad dressings, and soft margarines. You likely already get your daily oil allowance from the foods you eat.    Salt (sodium).  Many processed foods have a lot of  sodium. To keep sodium intake down, eat fresh vegetables, meats, poultry, and seafood when possible. Buy low-sodium, reduced-sodium, or no-salt-added food products at the store. And don't add salt to your meals at home. Instead, season them with herbs and spices such as dill, oregano, cumin, and paprika. Or try adding flavor with vinegar, onion, garlic, or lemon or lime zest and juice.    Saturated fat . Saturated fats are most often found in animal products such as beef, pork, and chicken. They are often solid at room temperature, such as butter. To reduce your saturated fat intake, choose leaner cuts of meat and poultry. And try healthier cooking methods such as grilling, broiling, roasting, or baking. For a simple lower-fat swap, use plain nonfat yogurt instead of mayonnaise when making potato salad or macaroni salad.    Added sugars.  These are sugars added to foods. They are in foods such as ice cream, candy, soda, fruit drinks, sports drinks, energy drinks, cookies, pastries, jams, and syrups. Cut down on added sugars by sharing sweet treats with a family member or friend. You can also choose fruit for dessert, and drink water or other unsweetened beverages.    Alcohol. Alcohol contains calories but few nutrients. If you don't drink alcohol, don't start drinking for any reason. But if you do choose to drink alcohol, do so only in moderation. This means no more than 2 drinks in a day for men and no more than 1 drink per day for women, on days when you have alcohol.  [employer] last reviewed this educational content on 1/1/2023 2000-2023 [employer]. All rights reserved. This information is not intended as a substitute for professional medical care. Always follow your healthcare professional's instructions.        Activities of Daily Living    Your Health Risk Assessment indicates you have difficulties with activities of daily living such as housework, bathing, preparing meals, taking medication,  etc. Please make a follow up appointment for us to address this issue in more detail.    Signs of Hearing Loss  Hearing loss is a problem shared by many people. In fact, it's one of the most common health problems, particularly as people age. Most people aged 65 and older have some hearing loss. By age 80, almost everyone does. Hearing loss often occurs slowly over the years. So, you may not realize your hearing has gotten worse.   When sudden hearing loss occurs, it's important to contact your healthcare provider right away. Your provider will do a medical exam and a hearing exam as soon as possible. This is to help find the cause and type of your sudden hearing loss. Based on your diagnosis, your healthcare provider will discuss possible treatments.      Hearing much better with one ear can be a sign of hearing loss.     Have your hearing checked  Call your healthcare provider if you:     Have to strain to hear normal conversation    Have to watch other people s faces very carefully to follow what they re saying    Need to ask people to repeat what they ve said    Often misunderstand what people are saying    Turn the volume of the television or radio up so high that others complain    Feel that people are mumbling when they re talking to you    Find that the effort to hear leaves you feeling tired and irritated    Notice, when using the phone, that you hear better with one ear than the other  [employer] last reviewed this educational content on 6/1/2022 2000-2023 [employer]. All rights reserved. This information is not intended as a substitute for professional medical care. Always follow your healthcare professional's instructions.          Urinary Incontinence, Female (Adult)   Urinary incontinence means loss of bladder control. This problem affects many women, especially as they get older. If you have incontinence, you may be embarrassed to ask for help. But know that this problem can be  treated.   Types of Incontinence  There are different types of incontinence. Two of the main types are described here. You can have more than one type.     Stress incontinence. With this type, urine leaks when pressure (stress) is put on the bladder. This may happen when you cough, sneeze, or laugh. Stress incontinence most often occurs because the pelvic floor muscles that support the bladder and urethra are weak. This can happen after pregnancy and vaginal childbirth or a hysterectomy. It can also be due to excess body weight or hormone changes.    Urge incontinence (also called overactive bladder). With this type, a sudden urge to urinate is felt often. This may happen even though there may not be much urine in the bladder. The need to urinate often during the night is common. Urge incontinence most often occurs because of bladder spasms. This may be due to bladder irritation or infection. Damage to bladder nerves or pelvic muscles, constipation, and certain medicines can also lead to urge incontinence.  Treatment depends on the cause. Further evaluation is needed to find the type you have. This will likely include an exam and certain tests. Based on the results, you and your healthcare provider can then plan treatment. Until a diagnosis is made, the home care tips below can help ease symptoms.   Home care    Do pelvic floor muscle exercises, if they are prescribed. The pelvic floor muscles help support the bladder and urethra. Many women find that their symptoms improve when doing special exercises that strengthen these muscles. To do the exercises, contract the muscles you would use to stop your stream of urine. But do this when you re not urinating. Hold for 10 seconds, then relax. Repeat 10 to 20 times in a row, at least 3 times a day. Your healthcare provider may give you other instructions for how to do the exercises and how often.    Keep a bladder diary. This helps track how often and how much you urinate  over a set period of time. Bring this diary with you to your next visit with the provider. The information can help your provider learn more about your bladder problem.    Lose weight, if advised to by your provider. Extra weight puts pressure on the bladder. Your provider can help you create a weight-loss plan that s right for you. This may include exercising more and making certain diet changes.    Don't have foods and drinks that may irritate the bladder. These can include alcohol and caffeinated drinks.    Quit smoking. Smoking and other tobacco use can lead to a long-term (chronic) cough that strains the pelvic floor muscles. Smoking may also damage the bladder and urethra. Talk with your provider about treatments or methods you can use to quit smoking.    If drinking large amounts of fluid makes you have symptoms, you may be advised to limit your fluid intake. You may also be advised to drink most of your fluids during the day and to limit fluids at night.    If you re worried about urine leakage or accidents, you may wear absorbent pads to catch urine. Change the pads often. This helps reduce discomfort. It may also reduce the risk of skin or bladder infections.    Follow-up care  Follow up with your healthcare provider, or as directed. It may take some to find the right treatment for your problem. But healthy lifestyle changes can be made right away. These include such things as exercising on a regular basis, eating a healthy diet, losing weight (if needed), and quitting smoking. Your treatment plan may include special therapies or medicines. Certain procedures or surgery may also be options. Talk about any questions you have with your provider.   When to seek medical advice  Call the healthcare provider right away if any of these occur:    Fever of 100.4 F (38 C) or higher, or as directed by your provider    Bladder pain or fullness    Belly swelling    Nausea or vomiting    Back pain    Weakness,  "dizziness, or fainting  [employer] last reviewed this educational content on 1/1/2020 2000-2022 [employer]. All rights reserved. This information is not intended as a substitute for professional medical care. Always follow your healthcare professional's instructions.        Your Health Risk Assessment indicates you feel you are not in good emotional health.    Recreation   Recreation is not limited to sports and team events. It includes any activity that provides relaxation, interest, enjoyment, and exercise. Recreation provides an outlet for physical, mental, and social energy. It can give a sense of worth and achievement. It can help you stay healthy.    Mental Exercise and Social Involvement  Mental and emotional health is as important as physical health. Keep in touch with friends and family. Stay as active as possible. Continue to learn and challenge yourself.   Things you can do to stay mentally active are:    Learn something new, like a foreign language or musical instrument.     Play SCRABBLE or do crossword puzzles. If you cannot find people to play these games with you at home, you can play them with others on your computer through the Internet.     Join a games club--anything from card games to chess or checkers or lawn bowling.     Start a new hobby.     Go back to school.     Volunteer.     Read.   Keep up with world events.    Depression and Suicide in Older Adults  Nearly 2 million older adults in the [place] have some type of depression. Some of them even take their own lives. Yet depression among older adults is often ignored. Learning about the warning signs of depression may help spare a loved one needless pain. You may also save a life.   What is depression?  Depression is a common and serious illness. It affects the way you think and feel. It is not a normal part of aging. It is not a sign of weakness, a character flaw, or something you can \"snap out of.\" Most people with depression " need treatment to get better. The most common symptom is a feeling of deep sadness. People who are depressed also may seem tired and listless. And nothing seems to give them pleasure. It s normal to grieve or be sad sometimes. But sadness lessens or passes with time. Depression rarely goes away or improves on its own. Other symptoms of depression are:     Sleeping more or less than normal    Eating more or less than normal    Having headaches, stomachaches, or other pains that don t go away    Feeling nervous,  empty,  or worthless    Crying a lot    Thinking or talking about suicide or death    Loss of interest in activities previously enjoyed    Social isolation    Feeling confused or forgetful  What causes it?  The causes of depression aren t fully known. But it is thought to result from a complex blend of these factors:     Biochemistry. Certain chemicals in the brain play a role.    Genes. Depression does run in families.    Life stress. Life stresses can also trigger depression in some people. Older adults often face many stressors. These may include isolation, the death of friends or a spouse, health problems, and financial concerns.    Chronic health conditions. This includes diabetes, heart disease, or cancer. These can cause symptoms of depression. Medicine side effects can cause changes in thoughts and behaviors.  Giving support    Depressed people often may not want to ask for help. When they do, they may be ignored. Or they may get the wrong treatment. You can help by showing parents and older friends love and support. If they seem depressed, don t lecture the person or ignore the symptoms. Don't discount the symptoms as a  normal  part of aging. They are not. Get involved, listen, and show interest and support.   Help them understand that depression is a treatable illness. Tell them you can help them find the right treatment. Offer to go to their healthcare provider's appointment with them for support  when the symptoms are discussed. With their approval, contact a local mental health center, social service agency, or hospital about services.   Helping at healthcare visits  You can be an advocate for them at healthcare appointments. Many older adults have chronic illnesses. Many of these can cause symptoms of depression. Medicine side effects can change thoughts and behaviors.   You can help make sure that the healthcare provider looks at all of these factors. They should refer your family member or friend to a mental healthcare provider when needed. In some cases, untreated depression can lead to a misdiagnosis. A person may be diagnosed with a brain disorder such as dementia. If the healthcare provider does not take the issue of depression seriously, help your family member or friend find another provider.   Asking about self-harm thoughts  If you think an older person you care about could be suicidal, ask,  Have you thought about suicide?  Most people will tell you the truth. If they say yes, they may already have a plan for how and when they will attempt it. Find out as much as you can. The more detailed the plan, and the easier it is to carry out, the more danger the person is in right now. Tell the person you are there for them and you do not want them to get harmed. Don't wait to get help for the person. Call the person's healthcare provider, local hospital, or emergency services.   Call 988 in a crisis   Never leave the person alone. A person who is actively suicidal needs crisis care right away. They need constant supervision. Never leave the person out of sight. Call 988 Tell the crisis counselor you need help for a person who is thinking about suicide. The counselor will help you get the right level of crisis help. You may be advised to take the person to the nearest emergency room.   The National Suicide Prevention Lifeline is available at 988, or 221-838-QGWL (599-498-5441), or  www.suicidepreventionlifeline.org. When you call or text 328, you will be connected to trained counselors who are part of the National Suicide Prevention Lifeline network. An online chat option is also available. Lifeline is free and available 24/7.   To learn more    National Suicide Prevention Lifeline at www.suicidepreventionlifeline.org  or 071-426-FBIZ (881-986-2660)    National [place] on Mental Illness at www.[employer].org  or 243-382-YTSK (134-385-9768)    Mental Health Melanie at www.[facility].org  or 392-638-5844    National [place] of Mental Health at www.[facility].nih.gov  or 777-841-1119    [employer] last reviewed this educational content on 7/1/2022 2000-2023 [employer]. All rights reserved. This information is not intended as a substitute for professional medical care. Always follow your healthcare professional's instructions.

## 2023-07-18 PROBLEM — E78.5 HYPERLIPIDEMIA LDL GOAL <70: Status: ACTIVE | Noted: 2023-07-18

## 2023-07-18 NOTE — RESULT ENCOUNTER NOTE
Mail letter:  Your cholesterol is high. I recommend increasing exercise and eating at least 4 to 5 servings of fruits and vegetables daily. Let's consider a daily cholesterol lowering medication like generic Lipitor.     Return for lab only recheck in 6 to 8 weeks while fasting (nothing but water and medications for at least 8 hours.)  You may call [phone] or go to [email or web address].    Blanca Watson MD

## 2023-07-19 ENCOUNTER — TRANSFERRED RECORDS (OUTPATIENT)
Dept: HEALTH INFORMATION MANAGEMENT | Facility: CLINIC | Age: 53
End: 2023-07-19
Payer: COMMERCIAL

## 2023-07-21 LAB
BKR LAB AP GYN ADEQUACY: NORMAL
BKR LAB AP GYN INTERPRETATION: NORMAL
BKR LAB AP HPV REFLEX: NORMAL
BKR LAB AP PREVIOUS ABNORMAL: NORMAL
PATH REPORT.COMMENTS IMP SPEC: NORMAL
PATH REPORT.COMMENTS IMP SPEC: NORMAL
PATH REPORT.RELEVANT HX SPEC: NORMAL

## 2023-07-25 ENCOUNTER — PATIENT OUTREACH (OUTPATIENT)
Dept: FAMILY MEDICINE | Facility: CLINIC | Age: 53
End: 2023-07-25
Payer: COMMERCIAL

## 2023-07-25 DIAGNOSIS — R87.810 CERVICAL HIGH RISK HPV (HUMAN PAPILLOMAVIRUS) TEST POSITIVE: Primary | ICD-10-CM

## 2023-07-25 LAB
HUMAN PAPILLOMA VIRUS 16 DNA: POSITIVE
HUMAN PAPILLOMA VIRUS 18 DNA: NEGATIVE
HUMAN PAPILLOMA VIRUS FINAL DIAGNOSIS: ABNORMAL
HUMAN PAPILLOMA VIRUS OTHER HR: NEGATIVE

## 2023-07-25 NOTE — LETTER
July 31, 2023      Gilson SORIANO Enedina  [address] [address]        Dear ,    We are contacting you in writing regarding your recent Pap smear and Human Papillomavirus (HPV) test because we have been unable to reach you by phone.    Your results showed a normal Pap smear and HPV positive.     HPV is a common virus found in sexually active men and women. There are many strains of HPV, but we test Pap samples for the high-risk types. High-risk HPV can be the cause of an abnormal Pap smear result and can also be a risk factor for later development of cervical cancer if not monitored and/or treated appropriately.    Because of these results, your provider has recommended that you have a colposcopy procedure. A colposcopy procedure allows the provider to more closely examine your vulva, vagina and cervix. If a problem is seen during the colposcopy, a small sample of tissue (biopsy) may be collected for laboratory testing. The results will be complete within two weeks and will be used to determine the plan for future follow-up.     Please call [phone] to schedule this procedure (this cannot be scheduled through [employer]). Schedule this for a time when you are not due to have a period (if having regular menstrual bleeding). No unprotected sex for 2 weeks prior to the colposcopy; a pregnancy test will be requested when you arrive for your appointment. Nothing in the vagina for 24 hours before your colposcopy (no sex, douches, vaginal medications or lubricants). You can take an over-the-counter pain reliever 1 hour before your appointment.    If you have additional questions regarding this result, please contact our Registered Nurse, Deepa, at [phone].      Sincerely,    Your [facility] Care Team

## 2023-08-17 RX ORDER — PANTOPRAZOLE SODIUM 40 MG/1
40 TABLET, DELAYED RELEASE ORAL
COMMUNITY
Start: 2023-05-17 | End: 2024-06-26

## 2023-09-21 LAB — NONINV COLON CA DNA+OCC BLD SCRN STL QL: NEGATIVE

## 2023-09-21 NOTE — RESULT ENCOUNTER NOTE
Linda,    Your Cologuard test results are normal. This means you are at low risk for colon cancer. This routine screening test should be completed every 3 years until age 75.     Blanca Watson MD

## 2023-10-31 ENCOUNTER — TELEPHONE (OUTPATIENT)
Dept: FAMILY MEDICINE | Facility: CLINIC | Age: 53
End: 2023-10-31
Payer: COMMERCIAL

## 2023-10-31 NOTE — TELEPHONE ENCOUNTER
Called patient and left a message to schedule a fasting lab appointment. When call is returned help schedule a fasting lab appointment.  Mallory Oneill CMA

## 2023-10-31 NOTE — TELEPHONE ENCOUNTER
Call to remind patient to schedule lab appointment for lipid panel recheck. We should consider a cholesterol lowering medication to lower stroke risk.   Blanca Watson MD

## 2023-11-06 NOTE — TELEPHONE ENCOUNTER
Called patient and left a message to schedule a lab appointment for a lipid panel. When call is returned please help schedule appointment.  Mallory Oneill CMA

## 2023-11-13 ENCOUNTER — OFFICE VISIT (OUTPATIENT)
Dept: PEDIATRICS | Facility: CLINIC | Age: 53
End: 2023-11-13
Payer: COMMERCIAL

## 2023-11-13 ENCOUNTER — NURSE TRIAGE (OUTPATIENT)
Dept: NURSING | Facility: CLINIC | Age: 53
End: 2023-11-13
Payer: COMMERCIAL

## 2023-11-13 VITALS
DIASTOLIC BLOOD PRESSURE: 83 MMHG | OXYGEN SATURATION: 98 % | HEART RATE: 86 BPM | TEMPERATURE: 98.4 F | SYSTOLIC BLOOD PRESSURE: 120 MMHG | RESPIRATION RATE: 18 BRPM

## 2023-11-13 DIAGNOSIS — R42 DIZZINESS: Primary | ICD-10-CM

## 2023-11-13 DIAGNOSIS — Z86.73 HISTORY OF STROKE: ICD-10-CM

## 2023-11-13 LAB
ALBUMIN UR-MCNC: NEGATIVE MG/DL
ANION GAP SERPL CALCULATED.3IONS-SCNC: 14 MMOL/L (ref 3–14)
APPEARANCE UR: CLEAR
BACTERIA #/AREA URNS HPF: ABNORMAL /HPF
BASOPHILS # BLD AUTO: 0 10E3/UL (ref 0–0.2)
BASOPHILS NFR BLD AUTO: 0 %
BILIRUB UR QL STRIP: NEGATIVE
BUN SERPL-MCNC: 10 MG/DL (ref 7–30)
CALCIUM SERPL-MCNC: 9 MG/DL (ref 8.5–10.1)
CHLORIDE BLD-SCNC: 100 MMOL/L (ref 94–109)
CO2 SERPL-SCNC: 29 MMOL/L (ref 20–32)
COLOR UR AUTO: YELLOW
CREAT SERPL-MCNC: 0.9 MG/DL (ref 0.52–1.04)
EGFRCR SERPLBLD CKD-EPI 2021: 77 ML/MIN/1.73M2
EOSINOPHIL # BLD AUTO: 0.1 10E3/UL (ref 0–0.7)
EOSINOPHIL NFR BLD AUTO: 1 %
ERYTHROCYTE [DISTWIDTH] IN BLOOD BY AUTOMATED COUNT: 14.7 % (ref 10–15)
GLUCOSE BLD-MCNC: 119 MG/DL (ref 70–99)
GLUCOSE UR STRIP-MCNC: NEGATIVE MG/DL
HCT VFR BLD AUTO: 45.6 % (ref 35–47)
HGB BLD-MCNC: 15.5 G/DL (ref 11.7–15.7)
HGB UR QL STRIP: ABNORMAL
IMM GRANULOCYTES # BLD: 0 10E3/UL
IMM GRANULOCYTES NFR BLD: 0 %
KETONES UR STRIP-MCNC: NEGATIVE MG/DL
LEUKOCYTE ESTERASE UR QL STRIP: NEGATIVE
LYMPHOCYTES # BLD AUTO: 2.7 10E3/UL (ref 0.8–5.3)
LYMPHOCYTES NFR BLD AUTO: 24 %
MCH RBC QN AUTO: 31.5 PG (ref 26.5–33)
MCHC RBC AUTO-ENTMCNC: 34 G/DL (ref 31.5–36.5)
MCV RBC AUTO: 93 FL (ref 78–100)
MONOCYTES # BLD AUTO: 1.3 10E3/UL (ref 0–1.3)
MONOCYTES NFR BLD AUTO: 12 %
NEUTROPHILS # BLD AUTO: 6.9 10E3/UL (ref 1.6–8.3)
NEUTROPHILS NFR BLD AUTO: 63 %
NITRATE UR QL: NEGATIVE
PH UR STRIP: 5.5 [PH] (ref 5–8)
PLATELET # BLD AUTO: 431 10E3/UL (ref 150–450)
POTASSIUM BLD-SCNC: 4.2 MMOL/L (ref 3.4–5.3)
RBC # BLD AUTO: 4.92 10E6/UL (ref 3.8–5.2)
RBC #/AREA URNS AUTO: ABNORMAL /HPF
SODIUM SERPL-SCNC: 143 MMOL/L (ref 133–144)
SP GR UR STRIP: 1.01 (ref 1–1.03)
SQUAMOUS #/AREA URNS AUTO: ABNORMAL /LPF
UROBILINOGEN UR STRIP-ACNC: 0.2 E.U./DL
WBC # BLD AUTO: 11.1 10E3/UL (ref 4–11)
WBC #/AREA URNS AUTO: ABNORMAL /HPF

## 2023-11-13 PROCEDURE — 36415 COLL VENOUS BLD VENIPUNCTURE: CPT | Performed by: FAMILY MEDICINE

## 2023-11-13 PROCEDURE — 80048 BASIC METABOLIC PNL TOTAL CA: CPT | Performed by: FAMILY MEDICINE

## 2023-11-13 PROCEDURE — 85025 COMPLETE CBC W/AUTO DIFF WBC: CPT | Performed by: FAMILY MEDICINE

## 2023-11-13 PROCEDURE — 99215 OFFICE O/P EST HI 40 MIN: CPT | Performed by: FAMILY MEDICINE

## 2023-11-13 PROCEDURE — 81001 URINALYSIS AUTO W/SCOPE: CPT | Performed by: FAMILY MEDICINE

## 2023-11-13 ASSESSMENT — PAIN SCALES - GENERAL: PAINLEVEL: NO PAIN (0)

## 2023-11-13 NOTE — TELEPHONE ENCOUNTER
Spoke with patient and advised [facility] per provider's message.    Patient agreeable to [facility] and requesting to be seen at [facility].    Spoke with nurse and provider at [facility]. Provider had advised if patient is able to receive a ride to [facility] then they are able to accept patient at 11 am.     Informed patient. Patient able to get a ride to [facility] and will go.    GEOFFREY Rivera RN  [facility]

## 2023-11-13 NOTE — TELEPHONE ENCOUNTER
Provider Response to 2nd Level Triage Request    I have reviewed the RN documentation. My recommendation is:  Refer to Acute and Diagnostic Services (ADS) clinic.     Referral to Acute and Diagnostic Services          Patient qualifies for First Order Acute services at the ADS clinic.    Patient diagnoses/treatments needed:  dizziness - labs and possible IVF   ADS Referral placed: Yes    Nursing staff to contact patient and confirm willingness to receive next level of care at the ADS site in [place] ([phone], internal use only), [place] ([phone], internal use only), [place] ([phone], internal use only) or [place] ([phone], internal use only) . Confirm the following are true:    Patient has transportation to travel to [facility] without delay  Patient understands that evaluation/treatment at [facility] typically takes significantly longer than in clinic/urgent care (>2 hours)    If patient is in agreement, contact the ADS to confirm patient acceptance and provide necessary information to ADS provider.       For patients going to the [place] ADS, instruct patients to enter the east entrance of the building ([address]) and go to Check In C    For patients going to the [place] ADS, have them enter building ([address] attached to [facility]) and go to the 2nd floor, [address]    For patients going to the [place] ADS, park on surface lot, use west center entrance door ([address], [facility]), clinic is on your left as you enter the building, [address].    For patients going to the [place] ADS, instruct patient to enter through the main entrance and follow the signage to [address], the ADS shares space with the Primary Care and Walk In Clinic

## 2023-11-13 NOTE — TELEPHONE ENCOUNTER
"Nurse Triage SBAR    Is this a 2nd Level Triage? YES, LICENSED PRACTITIONER REVIEW IS REQUIRED    Situation: Patient calling with concerns about dehydration. Has been feeling dizzy and nauseous off and on since Friday. Patient checked blood pressure while on the call and was 184/104, then 156/101 on recheck. Protocol advises discussing with PCP and callback from nurse today. Routing to provider to advise.  Consent: not needed    Background: Patient is concerned about dehydration even with  drinking 100 oz water per day. Drinking beverages with electrolytes - Body Armor  History of 3 strokes and 2 Pe's  Is taking Coumadin 4mg, Amlodipine 10mg and Doxepin 10mg daily in the evening. Took last night    Assessment:   Dizziness - mild  Nausea - vomited Friday, but none since  Patient urinated this morning, but is urinating less frequently than usual and at times going more than 8 hours between   No diarrhea  Dry mouth   Skin is dry and itchy  Blood pressure has been \"fine\" - 184/104, 87  currently  Rechecked BP and is 156/101, 78    Protocol Recommended Disposition:   Discuss with PCP and Call back by nurse today    Recommendation: Advised patient to wait for call-back after routing message to provider. Care advice given including when to call back. Patient verbalized understanding and agreed with plan.     Routing to provider to review and advise.    Does the patient meet one of the following criteria for ADS visit consideration? 16+ years old, with an [facility] PCP     TIP  Providers, please consider if this condition is appropriate for management at one of our Acute and Diagnostic Services sites.     If patient is a good candidate, please use dotphrase <dot>triageresponse and select Refer to ADS to document.     Michelle Levine RN [facility] 11/13/2023 8:59 AM    Reason for Disposition   Taking a medicine that could cause dizziness (e.g., blood pressure medications, diuretics)   Systolic BP >= 180 OR Diastolic " >= 110    Additional Information   Negative: SEVERE difficulty breathing (e.g., struggling for each breath, speaks in single words)   Negative: Shock suspected (e.g., cold/pale/clammy skin, too weak to stand, low BP, rapid pulse)   Negative: Difficult to awaken or acting confused (e.g., disoriented, slurred speech)   Negative: Fainted, and still feels dizzy afterwards   Negative: Overdose (accidental or intentional) of medications   Negative: New neurologic deficit that is present now: * Weakness of the face, arm, or leg on one side of the body * Numbness of the face, arm, or leg on one side of the body * Loss of speech or garbled speech   Negative: Heart beating < 50 beats per minute OR > 140 beats per minute   Negative: Sounds like a life-threatening emergency to the triager   Negative: Chest pain   Negative: Rectal bleeding, bloody stool, or tarry-black stool   Negative: Vomiting is main symptom   Negative: Diarrhea is main symptom   Negative: Headache is main symptom   Negative: Heat exhaustion suspected (i.e., dehydration from heat exposure)   Negative: Patient states that they are having an anxiety or panic attack   Negative: Dizziness from low blood sugar (i.e., < 60 mg/dl or 3.5 mmol/l)   Negative: SEVERE dizziness (e.g., unable to stand, requires support to walk, feels like passing out now)   Negative: SEVERE headache or neck pain   Negative: Spinning or tilting sensation (vertigo) present now and one or more stroke risk factors (i.e., hypertension, diabetes mellitus, prior stroke/TIA, heart attack, age over 60) (Exception: Prior physician evaluation for this AND no different/worse than usual.)   Negative: Neurologic deficit that was brief (now gone), ANY of the following:* Weakness of the face, arm, or leg on one side of the body* Numbness of the face, arm, or leg on one side of the body* Loss of speech or garbled speech   Negative: Loss of vision or double vision  (Exception: Similar to previous  migraines.)   Negative: Extra heartbeats, irregular heart beating, or heart is beating very fast (i.e., 'palpitations')   Negative: Difficulty breathing   Negative: Drinking very little and dehydration suspected (e.g., no urine > 12 hours, very dry mouth, very lightheaded)   Negative: Follows bleeding (e.g., stomach, rectum, vagina)  (Exception: Became dizzy from sight of small amount blood.)   Negative: Patient sounds very sick or weak to the triager   Negative: Lightheadedness (dizziness) present now, after 2 hours of rest and fluids   Negative: Spinning or tilting sensation (vertigo) present now   Negative: Fever > 103 F (39.4 C)   Negative: Fever > 100.0 F (37.8 C) and has diabetes mellitus or a weak immune system (e.g., HIV positive, cancer chemotherapy, organ transplant, splenectomy, chronic steroids)   Negative: MODERATE dizziness (e.g., interferes with normal activities)  (Exception: Dizziness caused by heat exposure, sudden standing, or poor fluid intake.)   Negative: Vomiting occurs with dizziness   Negative: Patient wants to be seen   Negative: Sounds like a life-threatening emergency to the triager   Negative: Symptom is main concern (e.g., headache, chest pain)   Negative: Low blood pressure is main concern   Negative: Systolic BP >= 160 OR Diastolic >= 100, and any cardiac (e.g., breathing difficulty, chest pain) or neurologic symptoms (e.g., new-onset blurred or double vision)   Negative: Pregnant 20 or more weeks (or postpartum < 6 weeks) with new hand or face swelling   Negative: Pregnant 20 or more weeks (or postpartum < 6 weeks) and Systolic BP >= 160 OR Diastolic >= 110   Negative: Patient sounds very sick or weak to the triager   Negative: Systolic BP >= 200 OR Diastolic >= 120 and having NO cardiac or neurologic symptoms   Negative: Pregnant 20 or more weeks (or postpartum < 6 weeks) with Systolic BP >= 140 OR Diastolic >= 90   Negative: Systolic BP >= 180 OR Diastolic >= 110, and missed most  recent dose of blood pressure medication    Protocols used: Dizziness-A-OH, Blood Pressure - High-A-OH

## 2023-11-13 NOTE — PROGRESS NOTES
{PROVIDER CHARTING PREFERENCE:208739}    Subjective   Linda is a 53 year old, presenting for the following health issues:  Dizziness, Nausea, and Spasms (Muscle spasm, had one on Friday night)  {(!) Visit Details have not yet been documented.  Please enter Visit Details and then use this list to pull in documentation. (Optional):866200}    HPI     Dizziness  Onset/Duration: Friday, 11/10/2023  Description:   Do you feel faint: No  Does it feel like the surroundings (bed, room) are moving: YES  Unsteady/off balance: YES  Have you passed out or fallen: No  Intensity: mild, 6/10  Progression of Symptoms: pt stated it comes and goes and intermittent  Accompanying Signs & Symptoms:  Heart palpitations or chest pain: No  Nausea, vomiting: YES- Nausea and vomited  Weakness or lack of coordination in arms or legs: No  Vision or speech changes: No  Numbness or tingling: No  Ringing in ears (Tinnitus): No  Hearing Loss: No  History:   Head trauma/concussion history: No  Previous similar symptoms: YES- Pt stated just when she's dehydrated.   Recent bleeding history: Nov of 2021 - low blood count platelets   Any new medications (BP?): No  Precipitating factors:   Worse with activity: No  Worse with head movement: No  Alleviating factors:   Does staying in a fixed position give relief: YES  Therapies tried and outcome: Just drinking water, gatorade/powerades        Review of Systems   {ROS COMP (Optional):552247}      Objective    /83 (BP Location: Right arm, Patient Position: Sitting, Cuff Size: Adult Large)   Pulse 67   Temp 98.4  F (36.9  C) (Oral)   Resp 17   There is no height or weight on file to calculate BMI.  Physical Exam   {Exam List (Optional):273859}    {Diagnostic Test Results (Optional):534008}    {AMBULATORY ATTESTATION (Optional):754913}

## 2023-11-13 NOTE — PROGRESS NOTES
Assessment & Plan     Dizziness  - CBC with platelets differential  - Basic metabolic panel  - Basic metabolic panel  - CBC with platelets differential  - UA Macroscopic with reflex to Microscopic and Culture - Clinic Collect  - UA Microscopic with Reflex to Culture    History of stroke  - CBC with platelets differential  - Basic metabolic panel  - Basic metabolic panel  - CBC with platelets differential       No neuro deficits appreciated and her mother confirms as collateral information with no abnormal symptoms present.     Electrolytes in the normal range. No hx of urinary retention.   No new back injuries. Patient does not appear dehydrated. Has not experienced vertigo. No suggestion of infection at this time.   Advised close monitoring of symptoms , seek help if no improvement in next days  We came to a shared decision to not proceed with head imaging to assess for stroke primarily driven by Linda's assurance that she has had no abnormal symptoms as noted      50 minutes spent on the date of the encounter doing chart review, history and exam, documentation and further activities per the note.         Mynor Julian MD   [facility]    Subjective     Linda is a 53 year old female who presents to clinic today for the following health issues:  Chief Complaint   Patient presents with    Dizziness    Nausea    Spasms     Muscle spasm, had one on Friday night     HPI    Patient noting over the last few days feeling little bit dehydrated has tried liquid IV. She is accompanied by her mother today. There is been no new episodes of facial droop her body weakness. She has a history of left-sided partial hemiparesis others can independently perform activities and walk without any difficulty. For stroke occurred shortly after pregnancy about 15 years ago for this reason she has maintained on blood thinners. She had previous history from what she recalls being a PFO that was repaired. Surprisingly she would  later go in to have 2 more minor strokes for reasons she does not know.     Feels her current symptoms do not feel like any of her prior 3 stroke episodes    Since her dizziness 2-3 days ago she has had no respiratory symptoms, fever or exposures to illnesses. No new deficits. No slurred speech. No visual difficulties    Denies congestion, ear pressure.     Partial weakness of left side of body from prior stroke. Ambulates independently without assistance  Patient Active Problem List    Diagnosis Date Noted    History of stroke, recurrent 2007, 2008 09/21/2011     Priority: High      2 in 2007, 2008--She was seen at the [facility]. Her balance was off. She has trouble concentrating and organizing since the strokes.      Seizure disorder (H) 09/21/2011     Priority: High     One seizure 8/2011, prior consult with Neurologist Dr. Macias      Hyperlipidemia LDL goal <70 07/18/2023     Priority: Medium    Morbid obesity (H) 06/06/2023     Priority: Medium    Hypertension goal BP (blood pressure) < 140/90 06/06/2023     Priority: Medium    Malpositioned intrauterine device, initial encounter 06/08/2016     Priority: Medium    Cervical high risk HPV (human papillomavirus) test positive 03/31/2014     Priority: Medium     3/31/14 NIL, + HR HPV 16. Plan colp  4/30/14 [place] Negative. Repeat pap and HPV in 1 year  6/1/15 NIL, + HR HPV 16. Plan colp  6/10/15 [place] Bx & ECC benign. Plan cotest in 1 year.   6/24/16 NIL Pap, Neg HPV. Plan cotest in 3 years.   7/17/23 NIL pap, +HR HPV 16. Plan: colposcopy due before 10/17/23  7/25/23 left message  7/27/23 left message  7/31/23 Result letter sent  9/21/23 Certified ltr sent. Tracking # [id number] - delivered 9/23/23  10/12/23 [place] not done. Tracking updated for 6 mo colp/pap due 1/17/23.        Long term current use of anticoagulant therapy 02/17/2014     Priority: Medium     Sees Dr. Brar at [place]  Problem list name updated by automated process. Provider to review       History of splenectomy 09/16/2013     Priority: Medium    History of pulmonary embolism 09/16/2013     Priority: Medium    Hemiparesis affecting left side as late effect of cerebrovascular accident (H) 09/16/2013     Priority: Medium    APS (antiphospholipid syndrome) (H24) 10/26/2012     Priority: Medium     Follows with Dr. Lopez ([facility])      Idiopathic thrombocytopenic purpura (H) 12/01/2011     Priority: Medium    Mild major depression (H) 09/21/2011     Priority: Medium     Follows with psychiatry, [facility] (Dr. Cass Chung)      RAKESH (obstructive sleep apnea) 02/24/2009     Priority: Medium     Formatting of this note might be different from the original.  AHI 8 sats 85%--APAP         Current Outpatient Medications   Medication    amLODIPine (NORVASC) 10 MG tablet    doxepin (SINEQUAN) 10 MG capsule    levonorgestrel (MIRENA) 20 MCG/24HR IUD    warfarin ANTICOAGULANT (COUMADIN) 1 MG tablet    warfarin ANTICOAGULANT (COUMADIN) 5 MG tablet    pantoprazole (PROTONIX) 40 MG EC tablet     No current facility-administered medications for this visit.           Objective    /83 (BP Location: Right arm, Patient Position: Sitting, Cuff Size: Adult Large)   Pulse 86   Temp 98.4  F (36.9  C) (Oral)   Resp 18   SpO2 98%   Physical Exam         Neuro: negative pronator drift, mild left intentional tremor, normal finger to nose bilaterally, absent of slurred speech.   Eyes: EOMI, do not appear sunken  GEN: NAD, normal speech, good historian    Results for orders placed or performed in visit on 11/13/23   Basic metabolic panel     Status: Abnormal   Result Value Ref Range    Sodium 143 133 - 144 mmol/L    Potassium 4.2 3.4 - 5.3 mmol/L    Chloride 100 94 - 109 mmol/L    Carbon Dioxide (CO2) 29 20 - 32 mmol/L    Anion Gap 14 3 - 14 mmol/L    Urea Nitrogen 10 7 - 30 mg/dL    Creatinine 0.90 0.52 - 1.04 mg/dL    GFR Estimate 77 >60 mL/min/1.73m2    Calcium 9.0 8.5 - 10.1 mg/dL    Glucose 119 (H) 70 - 99  mg/dL   CBC with platelets and differential     Status: Abnormal   Result Value Ref Range    WBC Count 11.1 (H) 4.0 - 11.0 10e3/uL    RBC Count 4.92 3.80 - 5.20 10e6/uL    Hemoglobin 15.5 11.7 - 15.7 g/dL    Hematocrit 45.6 35.0 - 47.0 %    MCV 93 78 - 100 fL    MCH 31.5 26.5 - 33.0 pg    MCHC 34.0 31.5 - 36.5 g/dL    RDW 14.7 10.0 - 15.0 %    Platelet Count 431 150 - 450 10e3/uL    % Neutrophils 63 %    % Lymphocytes 24 %    % Monocytes 12 %    % Eosinophils 1 %    % Basophils 0 %    % Immature Granulocytes 0 %    Absolute Neutrophils 6.9 1.6 - 8.3 10e3/uL    Absolute Lymphocytes 2.7 0.8 - 5.3 10e3/uL    Absolute Monocytes 1.3 0.0 - 1.3 10e3/uL    Absolute Eosinophils 0.1 0.0 - 0.7 10e3/uL    Absolute Basophils 0.0 0.0 - 0.2 10e3/uL    Absolute Immature Granulocytes 0.0 <=0.4 10e3/uL   UA Macroscopic with reflex to Microscopic and Culture - Clinic Collect     Status: Abnormal    Specimen: Urine, Clean Catch   Result Value Ref Range    Color Urine Yellow Colorless, Straw, Light Yellow, Yellow    Appearance Urine Clear Clear    Glucose Urine Negative Negative mg/dL    Bilirubin Urine Negative Negative    Ketones Urine Negative Negative mg/dL    Specific Gravity Urine 1.015 1.005 - 1.030    Blood Urine Trace (A) Negative    pH Urine 5.5 5.0 - 8.0    Protein Albumin Urine Negative Negative mg/dL    Urobilinogen Urine 0.2 0.2, 1.0 E.U./dL    Nitrite Urine Negative Negative    Leukocyte Esterase Urine Negative Negative   UA Microscopic with Reflex to Culture     Status: Abnormal   Result Value Ref Range    Bacteria Urine Few (A) None Seen /HPF    RBC Urine 0-2 0-2 /HPF /HPF    WBC Urine 0-5 0-5 /HPF /HPF    Squamous Epithelials Urine Few (A) None Seen /LPF    Narrative    Urine Culture not indicated   CBC with platelets differential     Status: Abnormal    Narrative    The following orders were created for panel order CBC with platelets differential.  Procedure                               Abnormality         Status                      ---------                               -----------         ------                     CBC with platelets and d...[[id number]]  Abnormal            Final result                 Please view results for these tests on the individual orders.                     The use of Dragon/PowerMic dictation services may have been used to construct the content in this note; any grammatical or spelling errors are non-intentional. Please contact the author of this note directly if you are in need of any clarification.

## 2023-12-20 ENCOUNTER — TELEPHONE (OUTPATIENT)
Dept: FAMILY MEDICINE | Facility: CLINIC | Age: 53
End: 2023-12-20

## 2023-12-20 NOTE — TELEPHONE ENCOUNTER
Patient Quality Outreach    Patient is due for the following:   Breast Cancer Screening - Mammogram  Depression  -  PHQ-9 needed      Topic Date Due    Hepatitis B Vaccine (1 of 3 - 3-dose series) Never done    Meningitis A Vaccine (1 - Risk 2-dose series) Never done    Pneumococcal Vaccine (3 of 3 - PPSV23 or PCV20) 05/26/2014    Diptheria Tetanus Pertussis (DTAP/TDAP/TD) Vaccine (3 - Td or Tdap) 05/28/2022    Zoster (Shingles) Vaccine (1 of 2) Never done    COVID-19 Vaccine (2 - 2023-24 season) 09/01/2023       Next Steps:   Schedule a office visit for Mammogram  Patient was assigned appropriate questionnaire to complete  Phq9 and SASE mailed.  Type of outreach:    Sent letter.      Questions for provider review:    None           Mallory Oneill CMA  Chart routed to Care Team.

## 2023-12-20 NOTE — LETTER
December 20, 2023    Gilson SORIANO Enedina  [address] [address]      Your team at [facility] cares about your health. We have reviewed your chart and based on our findings; we are making the following recommendations to better manage your health.     You are in particular need of attention regarding the following:     Complete the attached questionnaires to ensure your mental health needs are being properly met.  Please fill them out and send back to us via [employer], and feel free to call us with any questions or concerns at [phone].    Schedule Annual MAMMOGRAPHY. [facility] scheduling number is [phone] or schedule in [facility] (self referral).    If you have already completed these items, please contact the clinic via phone or   [employer] so your care team can review and update your records. Thank you for   choosing [facility] for your healthcare needs. For any questions,   concerns, or to schedule an appointment please contact our clinic.    Healthy Regards,      Your [facility] Care Team

## 2024-01-03 ENCOUNTER — PATIENT OUTREACH (OUTPATIENT)
Dept: FAMILY MEDICINE | Facility: CLINIC | Age: 54
End: 2024-01-03
Payer: COMMERCIAL

## 2024-01-03 DIAGNOSIS — R87.810 CERVICAL HIGH RISK HPV (HUMAN PAPILLOMAVIRUS) TEST POSITIVE: Primary | ICD-10-CM

## 2024-01-03 NOTE — LETTER
January 3, 2024      Gilson S Enedina  [address] [address]        Dear ,    At [facility], your health and wellness are our primary concern. That is why we are following up on your most recent positive high-risk Human Papillomavirus (HPV) test.    Please call [phone] to schedule an appointment for your recommended follow-up Colposcopy ([facility]) at your earliest convenience. If you have chosen not to do the recommended colposcopy, please contact your clinic to schedule an appointment for a repeat Pap smear and Human Papillomavirus (HPV) test at this time.    If you have completed the appointment outside of the [facility] system, please have the records forwarded to our office. We will update your chart for your provider to review before your next annual wellness visit.     Thank you for choosing [facility]!      Sincerely,    Your [facility] Care Team

## 2024-01-30 ASSESSMENT — PATIENT HEALTH QUESTIONNAIRE - PHQ9: SUM OF ALL RESPONSES TO PHQ QUESTIONS 1-9: 1

## 2024-01-30 NOTE — TELEPHONE ENCOUNTER
Patient Quality Outreach    Patient is due for the following:   Breast Cancer Screening - Mammogram  Depression  -  PHQ-9 needed      Topic Date Due    Hepatitis B Vaccine (1 of 3 - 3-dose series) Never done    Meningitis A Vaccine (1 - Risk 2-dose series) Never done    Pneumococcal Vaccine (3 of 3 - PPSV23 or PCV20) 05/26/2014    Diptheria Tetanus Pertussis (DTAP/TDAP/TD) Vaccine (3 - Td or Tdap) 05/28/2022    Zoster (Shingles) Vaccine (1 of 2) Never done    COVID-19 Vaccine (2 - 2023-24 season) 09/01/2023       Next Steps:   Spoke to Linda and completed Phq9 over the phone.    Type of outreach:    Phone, spoke to patient/parent. Completed Phq9    Next Steps:  Reach out within 90 days via Letter.    Max number of attempts reached: Yes. Will try again in 90 days if patient still on fail list.    Questions for provider review:    None           Mallory Oneill, [facility]  Chart routed to Care Team.

## 2024-02-21 ENCOUNTER — OFFICE VISIT (OUTPATIENT)
Dept: FAMILY MEDICINE | Facility: CLINIC | Age: 54
End: 2024-02-21
Payer: COMMERCIAL

## 2024-02-21 VITALS
TEMPERATURE: 97.2 F | HEART RATE: 91 BPM | SYSTOLIC BLOOD PRESSURE: 135 MMHG | WEIGHT: 215 LBS | HEIGHT: 66 IN | BODY MASS INDEX: 34.55 KG/M2 | RESPIRATION RATE: 18 BRPM | DIASTOLIC BLOOD PRESSURE: 83 MMHG | OXYGEN SATURATION: 98 %

## 2024-02-21 DIAGNOSIS — D68.61 APS (ANTIPHOSPHOLIPID SYNDROME) (H): ICD-10-CM

## 2024-02-21 DIAGNOSIS — Z79.01 LONG TERM CURRENT USE OF ANTICOAGULANT THERAPY: ICD-10-CM

## 2024-02-21 DIAGNOSIS — J32.9 SINUSITIS, UNSPECIFIED CHRONICITY, UNSPECIFIED LOCATION: Primary | ICD-10-CM

## 2024-02-21 PROCEDURE — 99213 OFFICE O/P EST LOW 20 MIN: CPT | Performed by: PHYSICIAN ASSISTANT

## 2024-02-21 RX ORDER — CEFDINIR 300 MG/1
300 CAPSULE ORAL 2 TIMES DAILY
Qty: 20 CAPSULE | Refills: 0 | Status: SHIPPED | OUTPATIENT
Start: 2024-02-21 | End: 2024-03-02

## 2024-02-21 ASSESSMENT — PAIN SCALES - GENERAL: PAINLEVEL: SEVERE PAIN (6)

## 2024-02-21 NOTE — PROGRESS NOTES
"  Assessment & Plan       ICD-10-CM    1. Sinusitis, unspecified chronicity, unspecified location  J32.9 cefdinir (OMNICEF) 300 MG capsule      2. Long term current use of anticoagulant therapy  Z79.01       3. APS (antiphospholipid syndrome) (H24)  D68.61       Warning signs discussed. Side effects discussed. Symptomatic treatment such as pushing fluids. Ok for over the counter acetaminophen and /or non-steroidal anti-inflammatory medication as needed.    Follow-up with her INR team to continue to manage INR now with antibiotics usage.     Jean Claude Mckeon is a 53 year old who has antiphospholipid syndrome and on anticoagulation therapy, presenting for the following health issues:  Sinus Problem    History of Present Illness       Headaches:   Since the patient's last clinic visit, headaches are: no change  The patient is getting headaches:  Daily  She is not able to do normal daily activities when she has a migraine.  The patient is taking the following rescue/relief medications:  Tylenol   Patient states \"The relief is inconsistent\" from the rescue/relief medications.   The patient is taking the following medications to prevent migraines:  No medications to prevent migraines  In the past 4 weeks, the patient has gone to an Urgent Care or Emergency Room 0 times times due to headaches.    Reason for visit:  Sinus infection  Symptom onset:  3-4 weeks ago  Symptoms include:  Sinus pressure cough sore throat  Symptom progression:  Staying the same  Had these symptoms before:  Yes  Has tried/received treatment for these symptoms:  No  What makes it worse:  Coughing    She eats 0-1 servings of fruits and vegetables daily.She consumes 1 sweetened beverage(s) daily.She exercises with enough effort to increase her heart rate 10 to 19 minutes per day.  She exercises with enough effort to increase her heart rate 3 or less days per week.   She is taking medications regularly.       Review of Systems  Constitutional, HEENT, " "cardiovascular, pulmonary, gi and gu systems are negative, except as otherwise noted.      Objective    /83   Pulse 91   Temp 97.2  F (36.2  C) (Tympanic)   Resp 18   Ht 1.676 m (5' 6\")   Wt 97.5 kg (215 lb)   SpO2 98%   BMI 34.70 kg/m    Body mass index is 34.7 kg/m .  Physical Exam     ICD-10-CM    1. Sinusitis, unspecified chronicity, unspecified location  J32.9 cefdinir (OMNICEF) 300 MG capsule      2. Long term current use of anticoagulant therapy  Z79.01       3. APS (antiphospholipid syndrome) (H24)  D68.61         Warning signs discussed. Side effects discussed. Symptomatic treatment such as pushing fluids. Ok for over the counter acetaminophen and /or non-steroidal anti-inflammatory medication as needed.    Follow up  2 wks as needed           Signed Electronically by: Bg Kirkland PA-C    "

## 2024-02-26 NOTE — TELEPHONE ENCOUNTER
FYI to provider - Patient is lost to pap tracking follow-up. Attempts to contact pt have been made per reminder process and there has been no reply and/or no appt scheduled. Contact hx listed below.     3/31/14 NIL, + HR HPV 16. Plan colp  4/30/14 [place] Negative. Repeat pap and HPV in 1 year  6/1/15 NIL, + HR HPV 16. Plan colp  6/10/15 [place] Bx & ECC benign. Plan cotest in 1 year.   6/24/16 NIL Pap, Neg HPV. Plan cotest in 3 years.   7/17/23 NIL pap, +HR HPV 16. Plan: colposcopy due before 10/17/23  7/25/23 left message  7/27/23 left message  7/31/23 Result letter sent  9/21/23 Certified ltr sent. Tracking # [id number] - delivered 9/23/23  10/12/23 [place] not done. Tracking updated for 6 mo colp/pap due 1/17/23.    01/03/24 Reminder letter  1/29/24 Reminder call -- left message  2/26/24 Lost to follow-up for pap tracking     Deepa Dumont RN BSN, Pap Tracking

## 2024-03-01 ENCOUNTER — TELEPHONE (OUTPATIENT)
Dept: FAMILY MEDICINE | Facility: CLINIC | Age: 54
End: 2024-03-01
Payer: COMMERCIAL

## 2024-03-01 DIAGNOSIS — B37.31 YEAST INFECTION OF THE VAGINA: Primary | ICD-10-CM

## 2024-03-01 RX ORDER — FLUCONAZOLE 150 MG/1
150 TABLET ORAL ONCE
Qty: 1 TABLET | Refills: 0 | Status: SHIPPED | OUTPATIENT
Start: 2024-03-01 | End: 2024-03-01

## 2024-03-01 NOTE — TELEPHONE ENCOUNTER
New Medication Request    Contacts         Type Contact Phone/Fax    03/01/2024 09:00 AM CST Phone (Incoming) Linda Mcconnell (Self) [phone] (H)            What medication are you requesting?: A medication for a yeast infection    Reason for medication request: You prescribed antibiotics last week, now she has a yeast infection.    Have you taken this medication before?: Yes    Controlled Substance Agreement on file:   CSA -- Patient Level:    CSA: None found at the patient level.         Patient offered an appointment? No    Preferred Pharmacy:    [id number] [address]  [facility] [address] [place] [address]  Phone: [phone] Fax: [phone]      Okay to leave a detailed message?: Yes at Cell number on file:    No relevant phone numbers on file.      Kasandra Jones [facility]

## 2024-04-27 DIAGNOSIS — I10 HYPERTENSION GOAL BP (BLOOD PRESSURE) < 140/90: ICD-10-CM

## 2024-04-29 RX ORDER — AMLODIPINE BESYLATE 10 MG/1
10 TABLET ORAL DAILY
Qty: 90 TABLET | Refills: 0 | Status: SHIPPED | OUTPATIENT
Start: 2024-04-29 | End: 2024-08-19

## 2024-05-01 ENCOUNTER — OFFICE VISIT (OUTPATIENT)
Dept: FAMILY MEDICINE | Facility: CLINIC | Age: 54
End: 2024-05-01
Payer: COMMERCIAL

## 2024-05-01 ENCOUNTER — ANCILLARY PROCEDURE (OUTPATIENT)
Dept: GENERAL RADIOLOGY | Facility: CLINIC | Age: 54
End: 2024-05-01
Attending: FAMILY MEDICINE
Payer: COMMERCIAL

## 2024-05-01 VITALS
WEIGHT: 215 LBS | HEIGHT: 66 IN | DIASTOLIC BLOOD PRESSURE: 87 MMHG | OXYGEN SATURATION: 100 % | TEMPERATURE: 97.8 F | SYSTOLIC BLOOD PRESSURE: 122 MMHG | BODY MASS INDEX: 34.55 KG/M2 | HEART RATE: 76 BPM | RESPIRATION RATE: 18 BRPM

## 2024-05-01 DIAGNOSIS — H00.011 HORDEOLUM EXTERNUM OF RIGHT UPPER EYELID: ICD-10-CM

## 2024-05-01 DIAGNOSIS — M72.2 PLANTAR FASCIITIS: ICD-10-CM

## 2024-05-01 DIAGNOSIS — M72.2 PLANTAR FASCIITIS: Primary | ICD-10-CM

## 2024-05-01 PROCEDURE — 73630 X-RAY EXAM OF FOOT: CPT | Mod: TC | Performed by: RADIOLOGY

## 2024-05-01 PROCEDURE — 99214 OFFICE O/P EST MOD 30 MIN: CPT | Performed by: FAMILY MEDICINE

## 2024-05-01 ASSESSMENT — PATIENT HEALTH QUESTIONNAIRE - PHQ9
SUM OF ALL RESPONSES TO PHQ QUESTIONS 1-9: 2
SUM OF ALL RESPONSES TO PHQ QUESTIONS 1-9: 2
10. IF YOU CHECKED OFF ANY PROBLEMS, HOW DIFFICULT HAVE THESE PROBLEMS MADE IT FOR YOU TO DO YOUR WORK, TAKE CARE OF THINGS AT HOME, OR GET ALONG WITH OTHER PEOPLE: SOMEWHAT DIFFICULT

## 2024-05-01 NOTE — PROGRESS NOTES
"  Assessment & Plan     Plantar fasciitis  See orders  - XR Foot Left G/E 3 Views; Future  - Orthopedic  Referral; Future    Hordeolum externum of right upper eyelid  Therapearls - see AVS    Post stroke, left UE/LE affected        BMI  Estimated body mass index is 34.7 kg/m  as calculated from the following:    Height as of this encounter: 1.676 m (5' 6\").    Weight as of this encounter: 97.5 kg (215 lb).           Jean Claude Mckeon is a 53 year old, presenting for the following health issues:  Musculoskeletal Problem (Left foot pain)        5/1/2024     9:22 AM   Additional Questions   Roomed by Chantel RECINOS CMA     History of Present Illness       Reason for visit:  Left Foot Pain    She eats 0-1 servings of fruits and vegetables daily.She consumes 0 sweetened beverage(s) daily.She exercises with enough effort to increase her heart rate 9 or less minutes per day.  She exercises with enough effort to increase her heart rate 3 or less days per week.   She is taking medications regularly.     Also has bump on right upper eyelid, has not tried anything to make it go away    Concern - Left foot pain  Onset: 4 months  Description: Pain in the foot when walking  Intensity: moderate  Progression of Symptoms:  worsening  Accompanying Signs & Symptoms: nothing  Previous history of similar problem: nothing  Precipitating factors:        Worsened by: walking  or standing too long  Alleviating factors:        Improved by: wearing a compression sock but does have history of blood clots and doesn't want to be wearing it 24/7  Therapies tried and outcome: compression, ice, heat, Tylenol Arthritis           Objective    /87 (BP Location: Right arm, Patient Position: Chair, Cuff Size: Adult Large)   Pulse 76   Temp 97.8  F (36.6  C) (Temporal)   Resp 18   Ht 1.676 m (5' 6\")   Wt 97.5 kg (215 lb)   LMP  (LMP Unknown)   SpO2 100%   BMI 34.70 kg/m    Body mass index is 34.7 kg/m .  Physical Exam   Left side " hemiplegia noted  NAD  Pain left calcaneus     Media Information      Document Information    Other: Photograph   Right upper eyelid   05/01/2024 9:43 AM   Attached To:   Office Visit on 5/1/24 with Yovany Jimenez DO   Source Information    Yovany Jimenez,   [facility]   Document History              Signed Electronically by: YOVANY JIMENEZ DO

## 2024-05-23 ENCOUNTER — OFFICE VISIT (OUTPATIENT)
Dept: PODIATRY | Facility: CLINIC | Age: 54
End: 2024-05-23
Attending: FAMILY MEDICINE
Payer: COMMERCIAL

## 2024-05-23 VITALS — OXYGEN SATURATION: 100 % | HEART RATE: 88 BPM

## 2024-05-23 DIAGNOSIS — Z86.73 HISTORY OF STROKE: Primary | ICD-10-CM

## 2024-05-23 DIAGNOSIS — M72.2 PLANTAR FASCIITIS: ICD-10-CM

## 2024-05-23 PROCEDURE — 99203 OFFICE O/P NEW LOW 30 MIN: CPT | Performed by: PODIATRIST

## 2024-05-23 NOTE — LETTER
5/23/2024         RE: Gilson Mcconnell  [address] [facility] [address]        Dear Colleague,    Thank you for referring your patient, Gilson Mcconnell, to the [facility]. Please see a copy of my visit note below.    Subjective:    Patient is seen today in consult from Dr. Jimenez for left heel pain.  Points to  plantar medial heel and plantar fascia and arch.  Pain aggravated by activity and relieved by rest.  This started and January 2024.  She was getting off a plane and stepped on some ice.  She had a slight inversion sprain and the pain started.  Denies any edema ecchymosis erythema or increased deformity.  Has history of 3 strokes and left lower extremity weakness.  States she wants to keep walking as she had to do a lot of therapy to be ambulatory on this foot again.  Patient is anticoagulated.  She has decreased sensation on this foot.    ROS:  see above       Allergies   Allergen Reactions     Amoxicillin-Pot Clavulanate Nausea and Vomiting     Gets really sick and cannot keep it down per patient       Current Outpatient Medications   Medication Sig Dispense Refill     amLODIPine (NORVASC) 10 MG tablet TAKE 1 TABLET(10 MG) BY MOUTH DAILY 90 tablet 0     doxepin (SINEQUAN) 10 MG capsule Take 1 capsule (10 mg) by mouth nightly as needed for sleep 30 capsule 11     levonorgestrel (MIRENA) 20 MCG/24HR IUD 1 each by Intrauterine route once.       pantoprazole (PROTONIX) 40 MG EC tablet 40 mg       warfarin ANTICOAGULANT (COUMADIN) 1 MG tablet Take 1 mg by mouth daily Take with the 5 mg as directed       warfarin ANTICOAGULANT (COUMADIN) 5 MG tablet Take 5 mg by mouth daily         Patient Active Problem List   Diagnosis     History of stroke, recurrent 2007, 2008     Seizure disorder (H)     Mild major depression (H)     Idiopathic thrombocytopenic purpura (H)     APS (antiphospholipid syndrome) (H24)     History of splenectomy     History of pulmonary embolism      Hemiparesis affecting left side as late effect of cerebrovascular accident (H)     Long term current use of anticoagulant therapy     Cervical high risk HPV (human papillomavirus) test positive     Malpositioned intrauterine device, initial encounter     RAKESH (obstructive sleep apnea)     Morbid obesity (H)     Hypertension goal BP (blood pressure) < 140/90     Hyperlipidemia LDL goal <70       Past Medical History:   Diagnosis Date     Antiphospholipid antibody positive      Cervical high risk HPV (human papillomavirus) test positive 3/2014, 2015    type 16     KIMBERLY (generalized anxiety disorder)      H/O colposcopy with cervical biopsy 2014, 6/10/15    Negative     Hemiparesis affecting left side as late effect of cerebrovascular accident (H) 2013     Hyperlipidemia LDL goal <70      Hypertension goal BP (blood pressure) < 140/90 2023     Idiopathic thrombocytopenic purpura (H) 2011     Mild major depression (H24)      Obesity 2013     Pulmonary embolism (H) 2005     Seizure disorder (H) 2011     Stroke (H) 2007, , 8/2008    X 3       Past Surgical History:   Procedure Laterality Date      SECTION      FTP     HEART CATH PFO CLOSURE  2007    PFO closure     REPAIR TONGUE LACER,<2.6CM  2012    [facility]     SPLENECTOMY  1999       Family History   Problem Relation Age of Onset     Diabetes Father      Hypertension Father      Cerebrovascular Disease Father         d.      C.A.D. Father 52        d. MI     Obesity Father      Cancer No family hx of        Social History     Tobacco Use     Smoking status: Former     Current packs/day: 0.00     Average packs/day: 0.5 packs/day for 8.0 years (4.0 ttl pk-yrs)     Types: Cigarettes     Start date: 1997     Quit date: 2005     Years since quittin.4     Passive exposure: Past     Smokeless tobacco: Never   Substance Use Topics     Alcohol use: No         Objective:    Vitals: Pulse 88   LMP  (LMP  Unknown)   SpO2 100%   BMI: There is no height or weight on file to calculate BMI.  Height: Data Unavailable    Constitutional/ general:  Pt is in no apparent distress, appears well-nourished.  Cooperative with history and physical exam.     Psych:  The patient answered questions appropriately.  Normal affect.  Seems to have reasonable expectations, in terms of treatment.     Lungs:  Non labored breathing, non labored speech. No cough.  No audible wheezing. Even, quiet breathing.       Vascular:  Pedal pulses are palpable bilaterally for both the DP and PT arteries.  CFT < 3 sec. Pedal hair growth noted.     Neuro:  Alert and oriented x 3. Coordinated gait.  Light touch sensation is intact     Derm: Normal texture and turgor.  No erythema, ecchymosis, or cyanosis.  No open lesions.     Musculoskeletal:   Lower extremity strength weak on the left.  Normal range of motion of all forefoot and rearfoot joints.  No pain lateral ankle or lateral foot.  Pain on left heel plantar medial.  Pain on the plantar aponeurosis in the arch.  No erythema ecchymosis or masses.    Radiographic Exam:  X-Ray Findings:  I personally reviewed the films.  Normal    Assessment:    Left lower extremity weakness status post stroke x 3   Plantar Fasciitis left foot     Plan:  X-rays from past personally reviewed.  We evaluated patient shoe.  Discussed etiology and treatment options with the patient.  The potential causes and nature of plantar fasciitis were discussed with the patient.  We reviewed the natural history/prognosis of the condition and risks if left untreated.  These include chronic pain, other sites of pain due to gait changes, and potential plantar fascial rupture.      We evaluated patient's shoe.  Somewhat malleable.  States she likes a lighter shoe because of her left lower extremity weakness.  Would be better if she could get a more supportive shoe.  May be somewhat difficult to get a more supportive shoe that is not  thicker.  We dispensed a heel cup.  We discussed icing and stretching.  We dispensed a night splint.  She has a good sandal that is quite stiff.  She will only wear this in the house.  Discussed other treatment options such as physical therapy and injection.  She is very motivated to resolve this since she works so hard getting her strength back.  Return to clinic in 4 weeks to ensure she is getting better.  Thank you for allowing me participate in the care of this patient.        Efrain Delgadillo DPM, FACFAS        Again, thank you for allowing me to participate in the care of your patient.        Sincerely,        Efrain Delgadillo DPM

## 2024-05-23 NOTE — PATIENT INSTRUCTIONS
We wish you continued good healing. If you have any questions or concerns, please do not hesitate to contact us at  [phone]    PlayJamt (secure e-mail communication and access to your chart) to send a message or to make an appointment.    Please remember to call and schedule a follow up appointment if one was recommended at your earliest convenience.     PODIATRY CLINIC HOURS  TELEPHONE NUMBER    Dr. Efrain MILLANPOMKAR FACFAS        Clinics:  [facility]   [place]  Tuesday 1PM-6PM  [place]  Wednesday 745AM-330PM  [place]  Monday 2nd,4th  830AM-4PM  Thursday/Friday 745AM-230PM     [place] APPOINTMENTS  [phone]    [place] APPOINTMENTS  [phone]          If you need a medication refill, please contact us you may need lab work and/or a follow up visit prior to your refill (i.e. Antifungal medications).  If MRI needed please call Imaging at [phone]   HOW DO I GET MY KNEE SCOOTER? Knee scooters can be picked up at ANY Medical Supply stores with your knee scooter Prescription.  OR  Bring your signed prescription to an [facility] showroom.   Set up an appointment for your custom Orthotics. Call any Orthotics locations call [phone]

## 2024-05-23 NOTE — PROGRESS NOTES
Subjective:    Patient is seen today in consult from Dr. Jimenez for left heel pain.  Points to  plantar medial heel and plantar fascia and arch.  Pain aggravated by activity and relieved by rest.  This started and January 2024.  She was getting off a plane and stepped on some ice.  She had a slight inversion sprain and the pain started.  Denies any edema ecchymosis erythema or increased deformity.  Has history of 3 strokes and left lower extremity weakness.  States she wants to keep walking as she had to do a lot of therapy to be ambulatory on this foot again.  Patient is anticoagulated.  She has decreased sensation on this foot.    ROS:  see above       Allergies   Allergen Reactions    Amoxicillin-Pot Clavulanate Nausea and Vomiting     Gets really sick and cannot keep it down per patient       Current Outpatient Medications   Medication Sig Dispense Refill    amLODIPine (NORVASC) 10 MG tablet TAKE 1 TABLET(10 MG) BY MOUTH DAILY 90 tablet 0    doxepin (SINEQUAN) 10 MG capsule Take 1 capsule (10 mg) by mouth nightly as needed for sleep 30 capsule 11    levonorgestrel (MIRENA) 20 MCG/24HR IUD 1 each by Intrauterine route once.      pantoprazole (PROTONIX) 40 MG EC tablet 40 mg      warfarin ANTICOAGULANT (COUMADIN) 1 MG tablet Take 1 mg by mouth daily Take with the 5 mg as directed      warfarin ANTICOAGULANT (COUMADIN) 5 MG tablet Take 5 mg by mouth daily         Patient Active Problem List   Diagnosis    History of stroke, recurrent 2007, 2008    Seizure disorder (H)    Mild major depression (H)    Idiopathic thrombocytopenic purpura (H)    APS (antiphospholipid syndrome) (H24)    History of splenectomy    History of pulmonary embolism    Hemiparesis affecting left side as late effect of cerebrovascular accident (H)    Long term current use of anticoagulant therapy    Cervical high risk HPV (human papillomavirus) test positive    Malpositioned intrauterine device, initial encounter    RAKESH (obstructive sleep  apnea)    Morbid obesity (H)    Hypertension goal BP (blood pressure) < 140/90    Hyperlipidemia LDL goal <70       Past Medical History:   Diagnosis Date    Antiphospholipid antibody positive     Cervical high risk HPV (human papillomavirus) test positive 3/2014, 2015    type 16    KIMBERLY (generalized anxiety disorder)     H/O colposcopy with cervical biopsy 2014, 6/10/15    Negative    Hemiparesis affecting left side as late effect of cerebrovascular accident (H) 2013    Hyperlipidemia LDL goal <70     Hypertension goal BP (blood pressure) < 140/90 2023    Idiopathic thrombocytopenic purpura (H) 2011    Mild major depression (H24)     Obesity 2013    Pulmonary embolism (H) 2005    Seizure disorder (H) 2011    Stroke (H) 2007, , 8/2008    X 3       Past Surgical History:   Procedure Laterality Date     SECTION      FTP    HEART CATH PFO CLOSURE  2007    PFO closure    REPAIR TONGUE LACER,<2.6CM  2012    [facility]    SPLENECTOMY  1999       Family History   Problem Relation Age of Onset    Diabetes Father     Hypertension Father     Cerebrovascular Disease Father         d.     C.A.D. Father 52        d. MI    Obesity Father     Cancer No family hx of        Social History     Tobacco Use    Smoking status: Former     Current packs/day: 0.00     Average packs/day: 0.5 packs/day for 8.0 years (4.0 ttl pk-yrs)     Types: Cigarettes     Start date: 1997     Quit date: 2005     Years since quittin.4     Passive exposure: Past    Smokeless tobacco: Never   Substance Use Topics    Alcohol use: No         Objective:    Vitals: Pulse 88   LMP  (LMP Unknown)   SpO2 100%   BMI: There is no height or weight on file to calculate BMI.  Height: Data Unavailable    Constitutional/ general:  Pt is in no apparent distress, appears well-nourished.  Cooperative with history and physical exam.     Psych:  The patient answered questions appropriately.  Normal  affect.  Seems to have reasonable expectations, in terms of treatment.     Lungs:  Non labored breathing, non labored speech. No cough.  No audible wheezing. Even, quiet breathing.       Vascular:  Pedal pulses are palpable bilaterally for both the DP and PT arteries.  CFT < 3 sec. Pedal hair growth noted.     Neuro:  Alert and oriented x 3. Coordinated gait.  Light touch sensation is intact     Derm: Normal texture and turgor.  No erythema, ecchymosis, or cyanosis.  No open lesions.     Musculoskeletal:   Lower extremity strength weak on the left.  Normal range of motion of all forefoot and rearfoot joints.  No pain lateral ankle or lateral foot.  Pain on left heel plantar medial.  Pain on the plantar aponeurosis in the arch.  No erythema ecchymosis or masses.    Radiographic Exam:  X-Ray Findings:  I personally reviewed the films.  Normal    Assessment:    Left lower extremity weakness status post stroke x 3   Plantar Fasciitis left foot     Plan:  X-rays from past personally reviewed.  We evaluated patient shoe.  Discussed etiology and treatment options with the patient.  The potential causes and nature of plantar fasciitis were discussed with the patient.  We reviewed the natural history/prognosis of the condition and risks if left untreated.  These include chronic pain, other sites of pain due to gait changes, and potential plantar fascial rupture.      We evaluated patient's shoe.  Somewhat malleable.  States she likes a lighter shoe because of her left lower extremity weakness.  Would be better if she could get a more supportive shoe.  May be somewhat difficult to get a more supportive shoe that is not thicker.  We dispensed a heel cup.  We discussed icing and stretching.  We dispensed a night splint.  She has a good sandal that is quite stiff.  She will only wear this in the house.  Discussed other treatment options such as physical therapy and injection.  She is very motivated to resolve this since she  works so hard getting her strength back.  Return to clinic in 4 weeks to ensure she is getting better.  Thank you for allowing me participate in the care of this patient.        Efrain Delgadillo, MAX, FACFAS

## 2024-05-28 ENCOUNTER — MYC MEDICAL ADVICE (OUTPATIENT)
Dept: FAMILY MEDICINE | Facility: CLINIC | Age: 54
End: 2024-05-28

## 2024-06-26 ENCOUNTER — VIRTUAL VISIT (OUTPATIENT)
Dept: FAMILY MEDICINE | Facility: CLINIC | Age: 54
End: 2024-06-26
Payer: COMMERCIAL

## 2024-06-26 DIAGNOSIS — D69.3 IDIOPATHIC THROMBOCYTOPENIC PURPURA (H): ICD-10-CM

## 2024-06-26 DIAGNOSIS — E66.01 MORBID OBESITY (H): ICD-10-CM

## 2024-06-26 DIAGNOSIS — I69.354 HEMIPARESIS AFFECTING LEFT SIDE AS LATE EFFECT OF CEREBROVASCULAR ACCIDENT (H): ICD-10-CM

## 2024-06-26 DIAGNOSIS — J01.90 ACUTE SINUSITIS WITH COEXISTING CONDITION, NEED PROPHYLACTIC TREATMENT: Primary | ICD-10-CM

## 2024-06-26 DIAGNOSIS — F33.9 RECURRENT MAJOR DEPRESSIVE DISORDER, REMISSION STATUS UNSPECIFIED (H): ICD-10-CM

## 2024-06-26 DIAGNOSIS — G40.909 SEIZURE DISORDER (H): ICD-10-CM

## 2024-06-26 PROCEDURE — 99213 OFFICE O/P EST LOW 20 MIN: CPT | Mod: 95 | Performed by: FAMILY MEDICINE

## 2024-06-26 RX ORDER — CEFUROXIME AXETIL 500 MG/1
500 TABLET ORAL 2 TIMES DAILY
Qty: 14 TABLET | Refills: 0 | Status: SHIPPED | OUTPATIENT
Start: 2024-06-26 | End: 2024-07-03

## 2024-06-26 RX ORDER — FLUCONAZOLE 150 MG/1
150 TABLET ORAL ONCE
Qty: 1 TABLET | Refills: 0 | Status: SHIPPED | OUTPATIENT
Start: 2024-06-26 | End: 2024-06-26

## 2024-06-26 NOTE — PROGRESS NOTES
Linda is a 53 year old who is being evaluated via a billable video visit.    How would you like to obtain your AVS? MyChart  If the video visit is dropped, the invitation should be resent by: Text to cell phone: [phone]  Will anyone else be joining your video visit? No      Assessment & Plan     Acute sinusitis with coexisting condition, need prophylactic treatment    Discussed the nature and pathophysiology of sinusitis and treatment including the role of antibiotics and the need to get over the underlying trigger, URI or allergies. In view of nature of drainage will empirically do an antibiotic.    - cefuroxime (CEFTIN) 500 MG tablet  Dispense: 14 tablet; Refill: 0  - fluconazole (DIFLUCAN) 150 MG tablet  Dispense: 1 tablet; Refill: 0    Idiopathic thrombocytopenic purpura (H)    - following with hematology    Hemiparesis affecting left side as late effect of cerebrovascular accident (H)    - Resolved    Seizure disorder (H)    - One seizure 8/2011, prior consult with Neurologist Dr. Macias    Morbid obesity (H)    -     Recurrent major depressive disorder, remission status unspecified (H24)   - in remission      See Patient Instructions    Subjective   Linda is a 53 year old, presenting for the following health issues:  URI  2:52 PM    Woke up teeth, head and   Been going on for over a week but worse   No fever  Has a little bit of a cough  Runny   Yellowish, green  Thought was allergies;  Flonase does not work for her        6/26/2024     2:10 PM   Additional Questions   Roomed by MARILU LARSON   Accompanied by self - video visit     Video Start Time:  2:52 PM    HPI         Objective    Vitals - Patient Reported  Pain Score: Severe Pain (7)      Vitals:  No vitals were obtained today due to virtual visit.    Physical Exam         Video-Visit Details    Type of service:  Video Visit   Video End Time: 3:06 PM  Originating Location (pt. Location): Home  Distant Location (provider location):  On-site  Platform used  for Video Visit: Rik  Signed Electronically by: Rainer Scott MD

## 2024-06-30 ENCOUNTER — HEALTH MAINTENANCE LETTER (OUTPATIENT)
Age: 54
End: 2024-06-30

## 2024-07-01 NOTE — TELEPHONE ENCOUNTER
Patient Quality Outreach    Patient is due for the following:   Breast Cancer Screening - Mammogram  Cervical Cancer Screening - PAP Needed  Physical Annual Wellness Visit      Topic Date Due    Meningitis A Vaccine (1 - Risk 2-dose series) Never done    Hepatitis B Vaccine (1 of 3 - 19+ 3-dose series) Never done    Pneumococcal Vaccine (3 of 3 - PPSV23 or PCV20) 05/26/2014    Diptheria Tetanus Pertussis (DTAP/TDAP/TD) Vaccine (3 - Td or Tdap) 05/28/2022    Zoster (Shingles) Vaccine (1 of 2) Never done    COVID-19 Vaccine (2 - 2023-24 season) 09/01/2023       Next Steps:   Patient has upcoming appointment, these items will be addressed at that time. 8/19/24 physical with PCP.    Type of outreach:    Chart review performed, no outreach needed.    Next Steps:  Reach out within 90 days via [employer]t.    Max number of attempts reached: Yes. Will try again in 90 days if patient still on fail list.    Questions for provider review:    None           Mallory Oneill CMA  Chart routed to Care Team.

## 2024-08-01 DIAGNOSIS — F33.9 RECURRENT MAJOR DEPRESSIVE DISORDER, REMISSION STATUS UNSPECIFIED (H): ICD-10-CM

## 2024-08-02 RX ORDER — DOXEPIN HYDROCHLORIDE 10 MG/1
CAPSULE ORAL
Qty: 90 CAPSULE | Refills: 0 | Status: SHIPPED | OUTPATIENT
Start: 2024-08-02 | End: 2024-08-19

## 2024-08-19 ENCOUNTER — OFFICE VISIT (OUTPATIENT)
Dept: FAMILY MEDICINE | Facility: CLINIC | Age: 54
End: 2024-08-19
Payer: COMMERCIAL

## 2024-08-19 VITALS
BODY MASS INDEX: 35.16 KG/M2 | OXYGEN SATURATION: 98 % | HEIGHT: 65 IN | HEART RATE: 69 BPM | SYSTOLIC BLOOD PRESSURE: 144 MMHG | DIASTOLIC BLOOD PRESSURE: 84 MMHG | RESPIRATION RATE: 16 BRPM | WEIGHT: 211 LBS | TEMPERATURE: 97.7 F

## 2024-08-19 DIAGNOSIS — D68.61 APS (ANTIPHOSPHOLIPID SYNDROME) (H): ICD-10-CM

## 2024-08-19 DIAGNOSIS — I69.354 HEMIPARESIS AFFECTING LEFT SIDE AS LATE EFFECT OF CEREBROVASCULAR ACCIDENT (H): ICD-10-CM

## 2024-08-19 DIAGNOSIS — E78.5 HYPERLIPIDEMIA LDL GOAL <70: ICD-10-CM

## 2024-08-19 DIAGNOSIS — G40.909 SEIZURE DISORDER (H): ICD-10-CM

## 2024-08-19 DIAGNOSIS — Z00.00 ENCOUNTER FOR MEDICARE ANNUAL WELLNESS EXAM: Primary | ICD-10-CM

## 2024-08-19 DIAGNOSIS — F33.9 RECURRENT MAJOR DEPRESSIVE DISORDER, REMISSION STATUS UNSPECIFIED (H): ICD-10-CM

## 2024-08-19 DIAGNOSIS — Z12.4 CERVICAL CANCER SCREENING: ICD-10-CM

## 2024-08-19 DIAGNOSIS — Z23 NEED FOR PROPHYLACTIC VACCINATION AGAINST HEPATITIS B VIRUS: ICD-10-CM

## 2024-08-19 DIAGNOSIS — Z23 NEED FOR TDAP VACCINATION: ICD-10-CM

## 2024-08-19 DIAGNOSIS — Z12.31 VISIT FOR SCREENING MAMMOGRAM: ICD-10-CM

## 2024-08-19 DIAGNOSIS — I10 HYPERTENSION GOAL BP (BLOOD PRESSURE) < 140/90: ICD-10-CM

## 2024-08-19 DIAGNOSIS — Z23 NEED FOR SHINGLES VACCINE: ICD-10-CM

## 2024-08-19 PROBLEM — E66.01 MORBID OBESITY (H): Status: RESOLVED | Noted: 2023-06-06 | Resolved: 2024-08-19

## 2024-08-19 LAB
ANION GAP SERPL CALCULATED.3IONS-SCNC: 12 MMOL/L (ref 7–15)
BUN SERPL-MCNC: 9.8 MG/DL (ref 6–20)
CALCIUM SERPL-MCNC: 9.7 MG/DL (ref 8.8–10.4)
CHLORIDE SERPL-SCNC: 104 MMOL/L (ref 98–107)
CHOLEST SERPL-MCNC: 198 MG/DL
CREAT SERPL-MCNC: 0.86 MG/DL (ref 0.51–0.95)
EGFRCR SERPLBLD CKD-EPI 2021: 81 ML/MIN/1.73M2
FASTING STATUS PATIENT QL REPORTED: YES
FASTING STATUS PATIENT QL REPORTED: YES
GLUCOSE SERPL-MCNC: 92 MG/DL (ref 70–99)
HCO3 SERPL-SCNC: 26 MMOL/L (ref 22–29)
HDLC SERPL-MCNC: 43 MG/DL
LDLC SERPL CALC-MCNC: 136 MG/DL
NONHDLC SERPL-MCNC: 155 MG/DL
POTASSIUM SERPL-SCNC: 4.4 MMOL/L (ref 3.4–5.3)
SODIUM SERPL-SCNC: 142 MMOL/L (ref 135–145)
TRIGL SERPL-MCNC: 95 MG/DL

## 2024-08-19 PROCEDURE — 80053 COMPREHEN METABOLIC PANEL: CPT | Performed by: FAMILY MEDICINE

## 2024-08-19 PROCEDURE — 84443 ASSAY THYROID STIM HORMONE: CPT | Performed by: FAMILY MEDICINE

## 2024-08-19 PROCEDURE — 80061 LIPID PANEL: CPT | Performed by: FAMILY MEDICINE

## 2024-08-19 PROCEDURE — 87624 HPV HI-RISK TYP POOLED RSLT: CPT | Performed by: FAMILY MEDICINE

## 2024-08-19 PROCEDURE — 82248 BILIRUBIN DIRECT: CPT | Performed by: FAMILY MEDICINE

## 2024-08-19 PROCEDURE — 99214 OFFICE O/P EST MOD 30 MIN: CPT | Mod: 25 | Performed by: FAMILY MEDICINE

## 2024-08-19 PROCEDURE — 36415 COLL VENOUS BLD VENIPUNCTURE: CPT | Performed by: FAMILY MEDICINE

## 2024-08-19 PROCEDURE — G0145 SCR C/V CYTO,THINLAYER,RESCR: HCPCS | Performed by: FAMILY MEDICINE

## 2024-08-19 PROCEDURE — G0439 PPPS, SUBSEQ VISIT: HCPCS | Performed by: FAMILY MEDICINE

## 2024-08-19 RX ORDER — DOXEPIN HYDROCHLORIDE 10 MG/1
10 CAPSULE ORAL
Qty: 90 CAPSULE | Refills: 4 | Status: SHIPPED | OUTPATIENT
Start: 2024-08-19

## 2024-08-19 RX ORDER — AMLODIPINE BESYLATE 10 MG/1
10 TABLET ORAL DAILY
Qty: 90 TABLET | Refills: 0 | Status: SHIPPED | OUTPATIENT
Start: 2024-08-19

## 2024-08-19 SDOH — HEALTH STABILITY: PHYSICAL HEALTH: ON AVERAGE, HOW MANY DAYS PER WEEK DO YOU ENGAGE IN MODERATE TO STRENUOUS EXERCISE (LIKE A BRISK WALK)?: 0 DAYS

## 2024-08-19 ASSESSMENT — PATIENT HEALTH QUESTIONNAIRE - PHQ9
SUM OF ALL RESPONSES TO PHQ QUESTIONS 1-9: 6
10. IF YOU CHECKED OFF ANY PROBLEMS, HOW DIFFICULT HAVE THESE PROBLEMS MADE IT FOR YOU TO DO YOUR WORK, TAKE CARE OF THINGS AT HOME, OR GET ALONG WITH OTHER PEOPLE: NOT DIFFICULT AT ALL
SUM OF ALL RESPONSES TO PHQ QUESTIONS 1-9: 6

## 2024-08-19 ASSESSMENT — SOCIAL DETERMINANTS OF HEALTH (SDOH): HOW OFTEN DO YOU GET TOGETHER WITH FRIENDS OR RELATIVES?: THREE TIMES A WEEK

## 2024-08-19 NOTE — PROGRESS NOTES
Preventive Care Visit  [facility] ROSITA Watson MD, Family Medicine  Aug 19, 2024      Assessment & Plan     Encounter for Medicare annual wellness exam  Cervical cancer screening  - Pap Screen with HPV - Recommended Age 30 - 65 Years    Hemiparesis affecting left side as late effect of cerebrovascular accident (H)  APS (antiphospholipid syndrome) (H24)  Seizure disorder (H)  Follow-up with hematology as planned   - Basic metabolic panel  (Ca, Cl, CO2, Creat, Gluc, K, Na, BUN); Future    Hyperlipidemia LDL goal <70  - Lipid panel reflex to direct LDL Fasting; Future  - OFFICE/OUTPT VISIT,EST,LEVL IV  - Primary Care - Care Coordination Referral; Future    Hypertension goal BP (blood pressure) < 140/90  Recommended blood pressure recheck   - amLODIPine (NORVASC) 10 MG tablet; Take 1 tablet (10 mg) by mouth daily  - OFFICE/OUTPT VISIT,EST,LEVL IV  - Primary Care - Care Coordination Referral; Future  - Basic metabolic panel  (Ca, Cl, CO2, Creat, Gluc, K, Na, BUN); Future    Recurrent major depressive disorder, remission status unspecified (H24)  - doxepin (SINEQUAN) 10 MG capsule; Take 1 capsule (10 mg) by mouth nightly as needed for sleep  - OFFICE/OUTPT VISIT,EST,LEVL IV  - Primary Care - Care Coordination Referral; Future    Visit for screening mammogram  - MA Screening Bilateral w/ Gabriel; Future    Need for prophylactic vaccination against hepatitis B virus  - hepatitis b vaccine recombinant (RECOMBIVAX-HB) 10 MCG/ML injection; Inject 0.5 mLs (5 mcg) into the muscle once for 1 dose    Need for Tdap vaccination  - Tdap, tetanus-diptheria-acell pertussis, (BOOSTRIX) 5-2.5-18.5 LF-MCG/0.5 ASAEL injection; Inject 0.5 mLs into the muscle once for 1 dose    Need for shingles vaccine  - zoster vaccine recombinant adjuvanted (SHINGRIX) injection; Inject 0.5 mLs into the muscle once for 1 dose Pharmacist administered            BMI  Estimated body mass index is 34.73 kg/m  as calculated from the  "following:    Height as of this encounter: 1.66 m (5' 5.35\").    Weight as of this encounter: 95.7 kg (211 lb).   Weight management plan: Discussed healthy diet and exercise guidelines    Counseling  Appropriate preventive services were addressed with this patient via screening, questionnaire, or discussion as appropriate for fall prevention, nutrition, physical activity, Tobacco-use cessation, social engagement, weight loss and cognition.  Checklist reviewing preventive services available has been given to the patient.  Reviewed patient's diet, addressing concerns and/or questions.   The patient was instructed to see the dentist every 6 months.   Updated plan of care.  Patient reported difficulty with activities of daily living were addressed today.Information on urinary incontinence and treatment options given to patient.   The patient's PHQ-9 score is consistent with mild depression. She was provided with information regarding depression.       See me yearly     Subjective   Linda is a 53 year old, presenting for the following:  Physical        8/19/2024    11:41 AM   Additional Questions   Roomed by Alicja HONYECUTT CMA   Accompanied by Self         8/19/2024    11:41 AM   Patient Reported Additional Medications   Patient reports taking the following new medications None         Health Care Directive  Patient does not have a Health Care Directive or Living Will: Patient states has Advance Directive and will bring in a copy to clinic.    HPI  She has Hx of recurrent CVA and disability. Would like to get more community and medical supports as her caregiver mother is aging. She also sees hematology.     Hypertension well controlled on current medications without side effects, chest pain, or dyspnea. She was recently off her medication but has resumed.     Patient has depression, recurrent, with no medication side effects and no anhedonia or low mood, without suicidal ideation.              8/19/2024   General Health   How " would you rate your overall physical health? Good   Feel stress (tense, anxious, or unable to sleep) To some extent      (!) STRESS CONCERN      8/19/2024   Nutrition   Diet: Regular (no restrictions)            8/19/2024   Exercise   Days per week of moderate/strenous exercise 0 days      (!) EXERCISE CONCERN      8/19/2024   Social Factors   Frequency of gathering with friends or relatives Three times a week   Worry food won't last until get money to buy more Patient declined   Food not last or not have enough money for food? Patient declined   Do you have housing? (Housing is defined as stable permanent housing and does not include staying ouside in a car, in a tent, in an abandoned building, in an overnight shelter, or couch-surfing.) Yes   Are you worried about losing your housing? No   Lack of transportation? No   Unable to get utilities (heat,electricity)? No            8/19/2024   Fall Risk   Fallen 2 or more times in the past year? No   Trouble with walking or balance? Yes   Gait Speed Test (Document in seconds) 4.91   Gait Speed Test Interpretation Less than or equal to 5.00 seconds - PASS             8/19/2024   Activities of Daily Living- Home Safety   Needs help with the following daily activites Shopping    Preparing meals    Housework    Laundry   Safety concerns in the home None of the above       Multiple values from one day are sorted in reverse-chronological order         8/19/2024   Dental   Dentist two times every year? (!) NO            8/19/2024   Hearing Screening   Hearing concerns? None of the above            8/19/2024   Driving Risk Screening   Patient/family members have concerns about driving No            8/19/2024   General Alertness/Fatigue Screening   Have you been more tired than usual lately? No            8/19/2024   Urinary Incontinence Screening   Bothered by leaking urine in past 6 months Yes            8/19/2024   TB Screening   Were you born outside of the [place]? No           Today's PHQ-9 Score:       2024    11:33 AM   PHQ-9 SCORE   PHQ-9 Total Score MyChart 6 (Mild depression)   PHQ-9 Total Score 6         2024   Substance Use   Alcohol more than 3/day or more than 7/wk No   Do you have a current opioid prescription? No   How severe/bad is pain from 1 to 10? 4/10   Do you use any other substances recreationally? (!) CANNABIS PRODUCTS        Social History     Tobacco Use    Smoking status: Former     Current packs/day: 0.00     Average packs/day: 0.5 packs/day for 8.0 years (4.0 ttl pk-yrs)     Types: Cigarettes     Start date: 1997     Quit date: 2005     Years since quittin.6     Passive exposure: Past    Smokeless tobacco: Never   Vaping Use    Vaping status: Never Used   Substance Use Topics    Alcohol use: No    Drug use: No          Mammogram Screening - Mammogram every 1-2 years updated in Health Maintenance based on mutual decision making      History of abnormal Pap smear: YES - other categories - see link Cervical Cytology Screening Guidelines        Latest Ref Rng & Units 2023     2:00 PM 2016     9:30 AM 2016    12:00 AM   PAP / HPV   PAP  Negative for Intraepithelial Lesion or Malignancy (NILM)      PAP (Historical)    NIL    HPV 16 DNA Negative Positive  Negative     HPV 18 DNA Negative Negative  Negative     Other HR HPV Negative Negative  Negative       ASCVD Risk   The ASCVD Risk score (Nakita HERNANDEZ, et al., 2019) failed to calculate for the following reasons:    The patient has a prior MI or stroke diagnosis            Reviewed and updated as needed this visit by Provider   Tobacco  Allergies  Meds  Problems  Med Hx  Surg Hx  Fam Hx     Sexual Activity          Patient Active Problem List   Diagnosis    History of stroke, recurrent , 2008    Seizure disorder (H)    Recurrent major depressive disorder, remission status unspecified (H24)    Idiopathic thrombocytopenic purpura (H)    APS (antiphospholipid  syndrome) (H24)    History of splenectomy    History of pulmonary embolism    Hemiparesis affecting left side as late effect of cerebrovascular accident (H)    Long term current use of anticoagulant therapy    Cervical high risk HPV (human papillomavirus) test positive    Malpositioned intrauterine device, initial encounter    RAKESH (obstructive sleep apnea)    Hypertension goal BP (blood pressure) < 140/90    Hyperlipidemia LDL goal <70     Past Surgical History:   Procedure Laterality Date     SECTION      FTP    HEART CATH PFO CLOSURE  2007    PFO closure    REPAIR TONGUE LACER,<2.6CM  2012    [facility]    SPLENECTOMY  1999       Social History     Tobacco Use    Smoking status: Former     Current packs/day: 0.00     Average packs/day: 0.5 packs/day for 8.0 years (4.0 ttl pk-yrs)     Types: Cigarettes     Start date: 1997     Quit date: 2005     Years since quittin.6     Passive exposure: Past    Smokeless tobacco: Never   Substance Use Topics    Alcohol use: No     Family History   Problem Relation Age of Onset    Diabetes Father     Hypertension Father     Cerebrovascular Disease Father         d.     C.A.D. Father 52        d. MI    Obesity Father     Cancer No family hx of          Current providers sharing in care for this patient include:  Patient Care Team:  Blanca Watson MD as PCP - General (Family Practice)  Teresa Lopez MD as MD (Oncology)  Blanca Watson MD as Assigned PCP  Efrain Delgadillo DPM as Assigned Surgical Provider    The following health maintenance items are reviewed in Epic and correct as of today:  Health Maintenance   Topic Date Due    MENINGITIS IMMUNIZATION (1 - Risk 2-dose series) Never done    HEPATITIS B IMMUNIZATION (1 of 3 - 19+ 3-dose series) Never done    Pneumococcal Vaccine: Pediatrics (0 to 5 Years) and At-Risk Patients (6 to 64 Years) (3 of 3 - PPSV23 or PCV20) 2014    MAMMO SCREENING  2018     "DTAP/TDAP/TD IMMUNIZATION (3 - Td or Tdap) 05/28/2022    ZOSTER IMMUNIZATION (1 of 2) Never done    COLPOSCOPY  10/17/2023    LIPID  07/17/2024    HPV TEST  07/17/2024    PAP  07/17/2024    COVID-19 Vaccine (2 - 2023-24 season) 09/02/2024 (Originally 9/1/2023)    INFLUENZA VACCINE (1) 09/01/2024    PHQ-9  02/19/2025    MEDICARE ANNUAL WELLNESS VISIT  08/19/2025    ANNUAL REVIEW OF HM ORDERS  08/19/2025    COLORECTAL CANCER SCREENING  09/14/2026    GLUCOSE  11/13/2026    ADVANCE CARE PLANNING  08/19/2029    HEPATITIS C SCREENING  Completed    HIV SCREENING  Completed    DEPRESSION ACTION PLAN  Completed    IPV IMMUNIZATION  Aged Out    HPV IMMUNIZATION  Aged Out    RSV MONOCLONAL ANTIBODY  Aged Out            Objective    Exam  BP (!) 144/84 (BP Location: Left arm, Patient Position: Sitting, Cuff Size: Adult Large)   Pulse 69   Temp 97.7  F (36.5  C) (Oral)   Resp 16   Ht 1.66 m (5' 5.35\")   Wt 95.7 kg (211 lb)   SpO2 98%   BMI 34.73 kg/m     Estimated body mass index is 34.73 kg/m  as calculated from the following:    Height as of this encounter: 1.66 m (5' 5.35\").    Weight as of this encounter: 95.7 kg (211 lb).    Physical Exam  GENERAL: alert and no distress  EYES: Eyes grossly normal to inspection, PERRL and conjunctivae and sclerae normal  HENT: ear canals and TM's normal, nose and mouth without ulcers or lesions  NECK: no adenopathy, no asymmetry, masses, or scars  RESP: lungs clear to auscultation - no rales, rhonchi or wheezes  CV: regular rate and rhythm, normal S1 S2, no S3 or S4, no murmur, click or rub, no peripheral edema  ABDOMEN: soft, nontender, no hepatosplenomegaly, no masses and bowel sounds normal  MS: no gross musculoskeletal defects noted, no edema  SKIN: no suspicious lesions or rashes  NEURO: weakness and sensory deficit of left upper and lower extremities, and mentation intact  PSYCH: mentation appears normal, affect normal/bright        8/19/2024   Mini Cog   Clock Draw Score 2 " Normal   3 Item Recall 2 objects recalled   Mini Cog Total Score 4                 Signed Electronically by: Blanca Watson MD    Answers submitted by the patient for this visit:  Patient Health Questionnaire (Submitted on 8/19/2024)  If you checked off any problems, how difficult have these problems made it for you to do your work, take care of things at home, or get along with other people?: Not difficult at all  PHQ9 TOTAL SCORE: 6

## 2024-08-19 NOTE — PATIENT INSTRUCTIONS
Did you know you can lower your blood pressure with your daily habits?    *Losing 20 pounds of weight lowers blood pressure 5 to 20 points.  *Eating a low-fat diet rich in fruits, vegetables and low-fat dairy lowers blood pressure 8 to 14 points.  *Eating a low-salt diet lowers blood pressure 2 to 8 points.  *Exercising regularly lowers blood pressure 4 to 9 points.  *Reducing alcohol use lowers blood pressure 2 to 4 points.     Patient Education   Preventive Care Advice   This is general advice given by our system to help you stay healthy. However, your care team may have specific advice just for you. Please talk to your care team about your preventive care needs.  Nutrition  Eat 5 or more servings of fruits and vegetables each day.  Try wheat bread, brown rice and whole grain pasta (instead of white bread, rice, and pasta).  Get enough calcium and vitamin D. Check the label on foods and aim for 100% of the RDA (recommended daily allowance).  Lifestyle  Exercise at least 150 minutes each week  (30 minutes a day, 5 days a week).  Do muscle strengthening activities 2 days a week. These help control your weight and prevent disease.  No smoking.  Wear sunscreen to prevent skin cancer.  Have a dental exam and cleaning every 6 months.  Yearly exams  See your health care team every year to talk about:  Any changes in your health.  Any medicines your care team has prescribed.  Preventive care, family planning, and ways to prevent chronic diseases.  Shots (vaccines)   HPV shots (up to age 26), if you've never had them before.  Hepatitis B shots (up to age 59), if you've never had them before.  COVID-19 shot: Get this shot when it's due.  Flu shot: Get a flu shot every year.  Tetanus shot: Get a tetanus shot every 10 years.  Pneumococcal, hepatitis A, and RSV shots: Ask your care team if you need these based on your risk.  Shingles shot (for age 50 and up)  General health tests  Diabetes screening:  Starting at age 35, Get  screened for diabetes at least every 3 years.  If you are younger than age 35, ask your care team if you should be screened for diabetes.  Cholesterol test: At age 39, start having a cholesterol test every 5 years, or more often if advised.  Bone density scan (DEXA): At age 50, ask your care team if you should have this scan for osteoporosis (brittle bones).  Hepatitis C: Get tested at least once in your life.  STIs (sexually transmitted infections)  Before age 24: Ask your care team if you should be screened for STIs.  After age 24: Get screened for STIs if you're at risk. You are at risk for STIs (including HIV) if:  You are sexually active with more than one person.  You don't use condoms every time.  You or a partner was diagnosed with a sexually transmitted infection.  If you are at risk for HIV, ask about PrEP medicine to prevent HIV.  Get tested for HIV at least once in your life, whether you are at risk for HIV or not.  Cancer screening tests  Cervical cancer screening: If you have a cervix, begin getting regular cervical cancer screening tests starting at age 21.  Breast cancer scan (mammogram): If you've ever had breasts, begin having regular mammograms starting at age 40. This is a scan to check for breast cancer.  Colon cancer screening: It is important to start screening for colon cancer at age 45.  Have a colonoscopy test every 10 years (or more often if you're at risk) Or, ask your provider about stool tests like a FIT test every year or Cologuard test every 3 years.  To learn more about your testing options, visit:   .  For help making a decision, visit:   https://bit.ly/pp35774.  Prostate cancer screening test: If you have a prostate, ask your care team if a prostate cancer screening test (PSA) at age 55 is right for you.  Lung cancer screening: If you are a current or former smoker ages 50 to 80, ask your care team if ongoing lung cancer screenings are right for you.  For informational purposes  only. Not to replace the advice of your health care provider. Copyright   2023 [facility]. All rights reserved. Clinically reviewed by the [facility] Transitions Program. [employer] 605996 - REV 01/24.  Learning About Activities of Daily Living  What are activities of daily living?     Activities of daily living (ADLs) are the basic self-care tasks you do every day. These include eating, bathing, dressing, and moving around.  As you age, and if you have health problems, you may find that it's harder to do some of these tasks. If so, your doctor can suggest ideas that may help.  To measure what kind of help you may need, your doctor will ask how well you are able to do ADLs. Let your doctor know if there are any tasks that you are having trouble doing. This is an important first step to getting help. And when you have the help you need, you can stay as independent as possible.  How will a doctor assess your ADLs?  Asking about ADLs is part of a routine health checkup your doctor will likely do as you age. Your health check might be done in a doctor's office, in your home, or at a hospital. The goal is to find out if you are having any problems that could make it hard to care for yourself or that make it unsafe for you to be on your own.  To measure your ADLs, your doctor will ask how hard it is for you to do routine tasks. Your doctor may also want to know if you have changed the way you do a task because of a health problem. Your doctor may watch how you:  Walk back and forth.  Keep your balance while you stand or walk.  Move from sitting to standing or from a bed to a chair.  Button or unbutton a shirt or sweater.  Remove and put on your shoes.  It's common to feel a little worried or anxious if you find you can't do all the things you used to be able to do. Talking with your doctor about ADLs is a way to make sure you're as safe as possible and able to care for yourself as well as you can. You  may want to bring a caregiver, friend, or family member to your checkup. They can help you talk to your doctor.  Follow-up care is a key part of your treatment and safety. Be sure to make and go to all appointments, and call your doctor if you are having problems. It's also a good idea to know your test results and keep a list of the medicines you take.  Current as of: October 24, 2023  Content Version: 14.1    7453-3437 [employer].   Care instructions adapted under license by your healthcare professional. If you have questions about a medical condition or this instruction, always ask your healthcare professional. [employer] disclaims any warranty or liability for your use of this information.    Preventing Falls: Care Instructions  Injuries and health problems such as trouble walking or poor eyesight can increase your risk of falling. So can some medicines. But there are things you can do to help prevent falls. You can exercise to get stronger. You can also arrange your home to make it safer.    Talk to your doctor about the medicines you take. Ask if any of them increase the risk of falls and whether they can be changed or stopped.   Try to exercise regularly. It can help improve your strength and balance. This can help lower your risk of falling.     Practice fall safety and prevention.    Wear low-heeled shoes that fit well and give your feet good support. Talk to your doctor if you have foot problems that make this hard.  Carry a cellphone or wear a medical alert device that you can use to call for help.  Use stepladders instead of chairs to reach high objects. Don't climb if you're at risk for falls. Ask for help, if needed.  Wear the correct eyeglasses, if you need them.    Make your home safer.    Remove rugs, cords, clutter, and furniture from walkways.  Keep your house well lit. Use night-lights in hallways and bathrooms.  Install and use sturdy handrails on stairways.  Wear  "nonskid footwear, even inside. Don't walk barefoot or in socks without shoes.    Be safe outside.    Use handrails, curb cuts, and ramps whenever possible.  Keep your hands free by using a shoulder bag or backpack.  Try to walk in well-lit areas. Watch out for uneven ground, changes in pavement, and debris.  Be careful in the winter. Walk on the grass or gravel when sidewalks are slippery. Use de-icer on steps and walkways. Add non-slip devices to shoes.    Put grab bars and nonskid mats in your shower or tub and near the toilet. Try to use a shower chair or bath bench when bathing.   Get into a tub or shower by putting in your weaker leg first. Get out with your strong side first. Have a phone or medical alert device in the bathroom with you.   Where can you learn more?  Go to https://www.[employer]/patiented  Enter G117 in the search box to learn more about \"Preventing Falls: Care Instructions.\"  Current as of: July 17, 2023               Content Version: 14.0    4885-0369 [employer].   Care instructions adapted under license by your healthcare professional. If you have questions about a medical condition or this instruction, always ask your healthcare professional. [employer] disclaims any warranty or liability for your use of this information.      Bladder Training: Care Instructions  Your Care Instructions     Bladder training is used to treat urge incontinence and stress incontinence. Urge incontinence means that the need to urinate comes on so fast that you can't get to a toilet in time. Stress incontinence means that you leak urine because of pressure on your bladder. For example, it may happen when you laugh, cough, or lift something heavy.  Bladder training can increase how long you can wait before you have to urinate. It can also help your bladder hold more urine. And it can give you better control over the urge to urinate.  It is important to remember that bladder training " takes a few weeks to a few months to make a difference. You may not see results right away, but don't give up.  Follow-up care is a key part of your treatment and safety. Be sure to make and go to all appointments, and call your doctor if you are having problems. It's also a good idea to know your test results and keep a list of the medicines you take.  How can you care for yourself at home?  Work with your doctor to come up with a bladder training program that is right for you. You may use one or more of the following methods.  Delayed urination  In the beginning, try to keep from urinating for 5 minutes after you first feel the need to go.  While you wait, take deep, slow breaths to relax. Kegel exercises can also help you delay the need to go to the bathroom.  After some practice, when you can easily wait 5 minutes to urinate, try to wait 10 minutes before you urinate.  Slowly increase the waiting period until you are able to control when you have to urinate.  Scheduled urination  Empty your bladder when you first wake up in the morning.  Schedule times throughout the day when you will urinate.  Start by going to the bathroom every hour, even if you don't need to go.  Slowly increase the time between trips to the bathroom.  When you have found a schedule that works well for you, keep doing it.  If you wake up during the night and have to urinate, do it. Apply your schedule to waking hours only.  Kegel exercises  These tighten and strengthen pelvic muscles, which can help you control the flow of urine. (If doing these exercises causes pain, stop doing them and talk with your doctor.) To do Kegel exercises:  Squeeze your muscles as if you were trying not to pass gas. Or squeeze your muscles as if you were stopping the flow of urine. Your belly, legs, and buttocks shouldn't move.  Hold the squeeze for 3 seconds, then relax for 5 to 10 seconds.  Start with 3 seconds, then add 1 second each week until you are able to  "squeeze for 10 seconds.  Repeat the exercise 10 times a session. Do 3 to 8 sessions a day.  When should you call for help?  Watch closely for changes in your health, and be sure to contact your doctor if:    Your incontinence is getting worse.     You do not get better as expected.   Where can you learn more?  Go to https://www.WHILL.net/patiented  Enter V684 in the search box to learn more about \"Bladder Training: Care Instructions.\"  Current as of: November 15, 2023               Content Version: 14.0    7806-2825 [employer].   Care instructions adapted under license by your healthcare professional. If you have questions about a medical condition or this instruction, always ask your healthcare professional. [employer] disclaims any warranty or liability for your use of this information.      Learning About Depression Screening  What is depression screening?  Depression screening is a way to see if you have depression symptoms. It may be done by a doctor or counselor. It's often part of a routine checkup. That's because your mental health is just as important as your physical health.  Depression is a mental health condition that affects how you feel, think, and act. You may:  Have less energy.  Lose interest in your daily activities.  Feel sad and grouchy for a long time.  Depression is very common. It affects people of all ages.  Many things can lead to depression. Some people become depressed after they have a stroke or find out they have a major illness like cancer or heart disease. The death of a loved one or a breakup may lead to depression. It can run in families. Most experts believe that a combination of inherited genes and stressful life events can cause it.  What happens during screening?  You may be asked to fill out a form about your depression symptoms. You and the doctor will discuss your answers. The doctor may ask you more questions to learn more about how you think, " "act, and feel.  What happens after screening?  If you have symptoms of depression, your doctor will talk to you about your options.  Doctors usually treat depression with medicines or counseling. Often, combining the two works best. Many people don't get help because they think that they'll get over the depression on their own. But people with depression may not get better unless they get treatment.  The cause of depression is not well understood. There may be many factors involved. But if you have depression, it's not your fault.  A serious symptom of depression is thinking about death or suicide. If you or someone you care about talks about this or about feeling hopeless, get help right away.  It's important to know that depression can be treated. Medicine, counseling, and self-care may help.  Where can you learn more?  Go to https://www.[employer].net/patiented  Enter T185 in the search box to learn more about \"Learning About Depression Screening.\"  Current as of: June 24, 2023  Content Version: 14.1 2006-2024 [employer].   Care instructions adapted under license by your healthcare professional. If you have questions about a medical condition or this instruction, always ask your healthcare professional. [employer] disclaims any warranty or liability for your use of this information.    Substance Use Disorder: Care Instructions  Overview     You can improve your life and health by stopping your use of alcohol or drugs. When you don't drink or use drugs, you may feel and sleep better. You may get along better with your family, friends, and coworkers. There are medicines and programs that can help with substance use disorder.  How can you care for yourself at home?  Here are some ways to help you stay sober and prevent relapse.  If you have been given medicine to help keep you sober or reduce your cravings, be sure to take it exactly as prescribed.  Talk to your doctor about programs " that can help you stop using drugs or drinking alcohol.  Do not keep alcohol or drugs in your home.  Plan ahead. Think about what you'll say if other people ask you to drink or use drugs. Try not to spend time with people who drink or use drugs.  Use the time and money spent on drinking or drugs to do something that's important to you.  Preventing a relapse  Have a plan to deal with relapse. Learn to recognize changes in your thinking that lead you to drink or use drugs. Get help before you start to drink or use drugs again.  Try to stay away from situations, friends, or places that may lead you to drink or use drugs.  If you feel the need to drink alcohol or use drugs again, seek help right away. Call a trusted friend or family member. Some people get support from organizations such as Narcotics Anonymous or [employer] or from treatment facilities.  If you relapse, get help as soon as you can. Some people make a plan with another person that outlines what they want that person to do for them if they relapse. The plan usually includes how to handle the relapse and who to notify in case of relapse.  Don't give up. Remember that a relapse doesn't mean that you have failed. Use the experience to learn the triggers that lead you to drink or use drugs. Then quit again. Recovery is a lifelong process. Many people have several relapses before they are able to quit for good.  Follow-up care is a key part of your treatment and safety. Be sure to make and go to all appointments, and call your doctor if you are having problems. It's also a good idea to know your test results and keep a list of the medicines you take.  When should you call for help?   Call 911  anytime you think you may need emergency care. For example, call if you or someone else:    Has overdosed or has withdrawal signs. Be sure to tell the emergency workers that you are or someone else is using or trying to quit using drugs. Overdose or withdrawal signs  "may include:  Losing consciousness.  Seizure.  Seeing or hearing things that aren't there (hallucinations).     Is thinking or talking about suicide or harming others.   Where to get help 24 hours a day, 7 days a week   If you or someone you know talks about suicide, self-harm, a mental health crisis, a substance use crisis, or any other kind of emotional distress, get help right away. You can:    Call the Suicide and Crisis Lifeline at 078.     Call 6-364-528-TALK (1-131.504.7850).     Text HOME to 675533 to access the Crisis Text Line.   Consider saving these numbers in your phone.  Go to [employer] for more information or to chat online.  Call your doctor now or seek immediate medical care if:    You are having withdrawal symptoms. These may include nausea or vomiting, sweating, shakiness, and anxiety.   Watch closely for changes in your health, and be sure to contact your doctor if:    You have a relapse.     You need more help or support to stop.   Where can you learn more?  Go to https://www.RedSeguro.net/patiented  Enter H573 in the search box to learn more about \"Substance Use Disorder: Care Instructions.\"  Current as of: November 15, 2023               Content Version: 14.0    8048-2046 Healthwise, [employer].   Care instructions adapted under license by your healthcare professional. If you have questions about a medical condition or this instruction, always ask your healthcare professional. Healthwise, [employer] disclaims any warranty or liability for your use of this information.         "

## 2024-08-20 ENCOUNTER — PATIENT OUTREACH (OUTPATIENT)
Dept: CARE COORDINATION | Facility: CLINIC | Age: 54
End: 2024-08-20
Payer: COMMERCIAL

## 2024-08-20 LAB
ALBUMIN SERPL BCG-MCNC: 4.1 G/DL (ref 3.5–5.2)
ALP SERPL-CCNC: 111 U/L (ref 40–150)
ALT SERPL W P-5'-P-CCNC: 12 U/L (ref 0–50)
AST SERPL W P-5'-P-CCNC: 24 U/L (ref 0–45)
BILIRUB DIRECT SERPL-MCNC: <0.2 MG/DL (ref 0–0.3)
BILIRUB SERPL-MCNC: 0.9 MG/DL
PROT SERPL-MCNC: 7.3 G/DL (ref 6.4–8.3)
TSH SERPL DL<=0.005 MIU/L-ACNC: 2.62 UIU/ML (ref 0.3–4.2)

## 2024-08-20 RX ORDER — ATORVASTATIN CALCIUM 20 MG/1
20 TABLET, FILM COATED ORAL DAILY
Qty: 90 TABLET | Refills: 4 | Status: SHIPPED | OUTPATIENT
Start: 2024-08-20

## 2024-08-20 NOTE — PROGRESS NOTES
Clinic Care Coordination Contact  Community Health Worker Initial Outreach    CHW Initial Information Gathering:  Referral Source: PCP  Preferred Hospital: [facility]  [phone]  Preferred Urgent Care: Other ([facility] [place])  Current living arrangement:: Other (Lives with Mom)  Type of residence:: Private home - stairs  Community Resources: None  Supplies Currently Used at Home: None  Equipment Currently Used at Home: none  Informal Support system:: Friends  No PCP office visit in Past Year: No  Transportation means:: Regular car       Patient accepts CC: Yes. Patient scheduled for assessment with the SW on 8/23/24 at 2:00 pm. Patient noted desire to discuss supports with chronic diagnosis, and navigation of long term care/[facility] services.     PRIYA Myles  [phone]  Connected Care Resource [facility]

## 2024-08-20 NOTE — RESULT ENCOUNTER NOTE
Linda,    Your blood glucose and kidney tests are normal.     I recommend that you take a daily cholesterol lowering medication to lower stroke risk. I've sent a prescription for generic Lipitor for when you're ready. Return for lab only recheck in 6 to 8 weeks while fasting (nothing but water and medications for at least 8 hours.)  You may call [phone] or go to [email or web address].    Blanca Wtason MD

## 2024-08-21 LAB
HPV HR 12 DNA CVX QL NAA+PROBE: NEGATIVE
HPV16 DNA CVX QL NAA+PROBE: POSITIVE
HPV18 DNA CVX QL NAA+PROBE: NEGATIVE
HUMAN PAPILLOMA VIRUS FINAL DIAGNOSIS: ABNORMAL

## 2024-08-23 ENCOUNTER — PATIENT OUTREACH (OUTPATIENT)
Dept: NURSING | Facility: CLINIC | Age: 54
End: 2024-08-23
Payer: COMMERCIAL

## 2024-08-23 ENCOUNTER — PATIENT OUTREACH (OUTPATIENT)
Dept: FAMILY MEDICINE | Facility: CLINIC | Age: 54
End: 2024-08-23

## 2024-08-23 LAB
BKR LAB AP GYN ADEQUACY: NORMAL
BKR LAB AP GYN INTERPRETATION: NORMAL
BKR LAB AP PREVIOUS ABNL DX: NORMAL
BKR LAB AP PREVIOUS ABNORMAL: NORMAL
PATH REPORT.COMMENTS IMP SPEC: NORMAL
PATH REPORT.COMMENTS IMP SPEC: NORMAL
PATH REPORT.RELEVANT HX SPEC: NORMAL

## 2024-08-23 NOTE — LETTER
August 27, 2024      Gilson SORIANO Enedina  [address] [address]        Dear ,    We are contacting you in writing regarding your recent Pap smear and Human Papillomavirus (HPV) test because we have been unable to reach you by phone.    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     HPV is a common virus found in sexually active men and women. There are many strains of HPV, but we test Pap samples for the high-risk types. High-risk HPV can be the cause of an abnormal Pap smear result and can also be a risk factor for later development of cervical cancer if not monitored and/or treated appropriately.    Because of these results, your provider has recommended that you have a colposcopy procedure. A colposcopy procedure allows the provider to more closely examine your vulva, vagina and cervix. If a problem is seen during the colposcopy, a small sample of tissue (biopsy) may be collected for laboratory testing. The results will be complete within two weeks and will be used to determine the plan for future follow-up.     Please call [phone] to schedule this procedure (this cannot be scheduled through [employer]). Schedule this for a time when you are not due to have a period (if having regular menstrual bleeding). No unprotected sex for 2 weeks prior to the colposcopy; a pregnancy test will be requested when you arrive for your appointment. Nothing in the vagina for 24 hours before your colposcopy (no sex, douches, vaginal medications or lubricants). You can take an over-the-counter pain reliever 1 hour before your appointment.    If you have additional questions regarding this result, please contact our Registered Nurse, Deepa, at [phone].      Sincerely,    Your [facility] Team

## 2024-08-23 NOTE — PROGRESS NOTES
"Clinic Care Coordination Contact    SW initial phone assessment.      Patient reports she had 3 CVA's in the past.  She was discharged from [facility] in 2008 as her mother was planning to live with her.  She reports her mother is 81 now and her child is graduating from school soon.  Patient is interested in additional support.  SW informed that one must have MA to be eligible for potential [facility] services, patient reported she is over income.  SW discussed the Alternative Care Program, stating she must have personal needs.  Patient reported desire with homemaking needs.  SW stated she would likely not be eligible for these programs.  SW offered to provide the # for [place] [facility] to discuss/inquire, patient hung up and stated \"thanks for nothing\".      SW will nit intervene further and will close to CC     Kristie Morgan, UMERW, MSW   [facility]  Care Coordination  [facility]  [phone]  8/23/2024 2:15 PM   "

## 2024-09-07 DIAGNOSIS — F33.9 RECURRENT MAJOR DEPRESSIVE DISORDER, REMISSION STATUS UNSPECIFIED (H): ICD-10-CM

## 2024-09-09 RX ORDER — DOXEPIN HYDROCHLORIDE 10 MG/1
CAPSULE ORAL
Qty: 30 CAPSULE | OUTPATIENT
Start: 2024-09-09

## 2024-11-05 ENCOUNTER — TELEPHONE (OUTPATIENT)
Dept: OBGYN | Facility: CLINIC | Age: 54
End: 2024-11-05
Payer: COMMERCIAL

## 2024-11-05 NOTE — TELEPHONE ENCOUNTER
[facility] Call Center    Phone Message    May a detailed message be left on voicemail: yes, but unsure if it works. Please try more than once    Reason for Call: Pt calling to schedule colposcopy. No answer secure chat. Please call Thank you    Action Taken: Message routed to:  Other: JANUARY NICOLE    Travel Screening: Not Applicable

## 2024-11-13 ENCOUNTER — OFFICE VISIT (OUTPATIENT)
Dept: OBGYN | Facility: CLINIC | Age: 54
End: 2024-11-13
Payer: COMMERCIAL

## 2024-11-13 VITALS
BODY MASS INDEX: 34.34 KG/M2 | SYSTOLIC BLOOD PRESSURE: 114 MMHG | HEART RATE: 79 BPM | DIASTOLIC BLOOD PRESSURE: 79 MMHG | WEIGHT: 208.6 LBS | OXYGEN SATURATION: 100 %

## 2024-11-13 DIAGNOSIS — R87.810 CERVICAL HIGH RISK HPV (HUMAN PAPILLOMAVIRUS) TEST POSITIVE: Primary | ICD-10-CM

## 2024-11-13 PROCEDURE — 57454 BX/CURETT OF CERVIX W/SCOPE: CPT | Performed by: OBSTETRICS & GYNECOLOGY

## 2024-11-13 NOTE — PROGRESS NOTES
Patient Name: Gilson SORIANO [facility]              Date: 11/13/2024   YOB: 1970                         Age: 54 year old   Phone: [phone] (home)   ________________________________________________________________________  I have been asked to see Gilson in consultation by Dr. Watson  to discuss the pap smear, findings and possible further evaluation.  She is considered new to the [facility] department as she has not been seen here since about 8.5 years ago.    Her pap smear history is as noted:  3/31/14 NIL, + HR HPV 16. Plan colp  4/30/14 [place] Negative. Repeat pap and HPV in 1 year  6/1/15 NIL, + HR HPV 16. Plan colp  6/10/15 [place] Bx & ECC benign. Plan cotest in 1 year.   6/24/16 NIL Pap, Neg HPV. Plan cotest in 3 years.   7/17/23 NIL pap, +HR HPV 16. Plan: colposcopy due before 10/17/23  2/26/24 Lost to follow-up for pap tracking   8/19/24 NIL pap, + HR HPV 16.     I attempted to ensure that the patient was educated regarding the nature of her findings and implications to date.  We reviewed the role of HPV, incidence in the population and the natural history of the infection, and its transmission.  We also reviewed ways to minimize her future risk, the effect of HPV on the cervix and treatment options available, should they be indicated.    The pathophysiology of the cervix, including a discussion of the squamous and columnar cells, metaplasia and dysplasia have been reviewed, drawings, sketches and the pamphlets were reviewed with her.      No LMP recorded (lmp unknown). (Menstrual status: IUD).  Current Birth Control Method: IUD Mirena  Age at first sexual intercourse: 18 years old  Number of sexual partners (lifetime): uncertain   History of veneral diseases: HPV  History of genital warts:  No  Visible warts now?:  No  Family History of  Cervical, Uterine or Vaginal Cancer?  uncertain     Past Medical History:   Diagnosis Date    Antiphospholipid antibody positive     Cervical high risk HPV  (human papillomavirus) test positive 3/2014, 2015    type 16    KIMBERLY (generalized anxiety disorder)     H/O colposcopy with cervical biopsy 2014, 6/10/15    Negative    Hemiparesis affecting left side as late effect of cerebrovascular accident (H) 2013    Hyperlipidemia LDL goal <70     Hypertension goal BP (blood pressure) < 140/90 2023    Idiopathic thrombocytopenic purpura (H) 2011    Mild major depression (H)     Obesity 2013    Pulmonary embolism (H) 2005    Seizure disorder (H) 2011    Stroke (H) 2007, , 8/2008    X 3       Past Surgical History:   Procedure Laterality Date     SECTION      FTP    HEART CATH PFO CLOSURE  2007    PFO closure    REPAIR TONGUE LACER,<2.6CM  2012    [facility]    SPLENECTOMY  1999        Outpatient Encounter Medications as of 2024   Medication Sig Dispense Refill    amLODIPine (NORVASC) 10 MG tablet Take 1 tablet (10 mg) by mouth daily 90 tablet 0    atorvastatin (LIPITOR) 20 MG tablet Take 1 tablet (20 mg) by mouth daily 90 tablet 4    doxepin (SINEQUAN) 10 MG capsule Take 1 capsule (10 mg) by mouth nightly as needed for sleep 90 capsule 4    levonorgestrel (MIRENA) 20 MCG/24HR IUD 1 each by Intrauterine route once.      warfarin ANTICOAGULANT (COUMADIN) 1 MG tablet Take 1 mg by mouth daily Take with the 5 mg as directed       No facility-administered encounter medications on file as of 2024.        Allergies as of 2024 - Reviewed 2024   Allergen Reaction Noted    Amoxicillin-pot clavulanate Nausea and Vomiting 2018       Social History     Socioeconomic History    Marital status: Single     Spouse name: None    Number of children: 1    Years of education: 13    Highest education level: None   Occupational History    Occupation:       Employer: OTHER   Tobacco Use    Smoking status: Former     Current packs/day: 0.00     Average packs/day: 0.5 packs/day for 8.0 years (4.0 ttl pk-yrs)      Types: Cigarettes     Start date: 1997     Quit date: 2005     Years since quittin.8     Passive exposure: Past    Smokeless tobacco: Never   Vaping Use    Vaping status: Never Used   Substance and Sexual Activity    Alcohol use: No    Drug use: No    Sexual activity: Yes     Partners: Male     Birth control/protection: I.U.D.     Social Drivers of Health     Financial Resource Strain: Low Risk  (2024)    Financial Resource Strain     Within the past 12 months, have you or your family members you live with been unable to get utilities (heat, electricity) when it was really needed?: No   Food Insecurity: Unknown (2024)    Food Insecurity     Within the past 12 months, did you worry that your food would run out before you got money to buy more?: Patient declined     Within the past 12 months, did the food you bought just not last and you didn t have money to get more?: Patient declined   Transportation Needs: Low Risk  (2024)    Transportation Needs     Within the past 12 months, has lack of transportation kept you from medical appointments, getting your medicines, non-medical meetings or appointments, work, or from getting things that you need?: No   Physical Activity: Unknown (2024)    Exercise Vital Sign     Days of Exercise per Week: 0 days   Stress: Stress Concern Present (2024)    [employer] - Occupational Stress Questionnaire     Feeling of Stress : To some extent   Social Connections: Unknown (2024)    Social Connection and Isolation Panel [NHANES]     Frequency of Social Gatherings with Friends and Family: Three times a week   Interpersonal Safety: Low Risk  (2024)    Interpersonal Safety     Do you feel physically and emotionally safe where you currently live?: Yes     Within the past 12 months, have you been hit, slapped, kicked or otherwise physically hurt by someone?: No     Within the past 12 months, have you been humiliated  or emotionally abused in other ways by your partner or ex-partner?: No   Housing Stability: Low Risk  (8/19/2024)    Housing Stability     Do you have housing? : Yes     Are you worried about losing your housing?: No        Family History   Problem Relation Age of Onset    Diabetes Father     Hypertension Father     Cerebrovascular Disease Father         d.     C.A.D. Father 52        d. MI    Obesity Father     Cancer No family hx of          Review Of Systems  10 point ROS of systems including Constitutional, Eyes, Respiratory, Cardiovascular, Gastroenterology, Genitourinary, Integumentary, Muscularskeletal, Psychiatric were all negative except for pertinent positives noted in my HPI and in the PMH.      Exam:   /79 (BP Location: Right arm, Cuff Size: Adult Large)   Pulse 79   Wt 94.6 kg (208 lb 9.6 oz)   LMP  (LMP Unknown)   SpO2 100%   BMI 34.34 kg/m    GENERAL:  WNWD female NAD  HEENT: NC/AT, EOMI  Lungs:  Good respiratory effort   SKIN: normal skin turgor  GAIT: Normal  NECK: Symmetrical, no masses noted   VULVA: Normal Genitalia  BUS: Normal  URETHRA:  No hypermobility noted  URETHRAL MEATUS:  No masses noted  VAGINA: Normal mucosa, no discharge  CERVIX: Closed, mobile, no discharge  PERIANAL:  No masses or lesions seen  EXTREMITIES: no clubbing, cyanosis, or edema    Assessment:  HR HPV of cervix     Plan:  Recommend to Proceed with Colpo  The details of the colposcopic procedure were reviewed, the risks of missed diagnoses, pain, infection, and bleeding.    Total time preparing to see patient with reviewing prior encounter and labs, face to face time, and coordinating care on the same calendar date: 30 minutes, with additional time for the procedure noted below.       Brian Min MD        Procedure:  Procedure for colposcopy and biopsy has been explained to the patient and consent obtained.    Before the procedure, it was ensured that the patient was educated regarding the nature of her  findings and implications to date.  We reviewed the role of HPV and the natural history of the infection.  We also reviewed ways to minimize her future risk, the effect of HPV on the cervix and treatment options available, should they be indicated.    The pathophysiology of the cervix, including a discussion of the squamous and columnar cells, metaplasia and dysplasia have been reviewed, drawings, sketches and the pamphlets were reviewed with her.  The details of the colposcopic procedure were reviewed, the risks of missed diagnoses, pain, infection, and bleeding.  Questions seemed to be answered before proceeding and the patient then consented to the procedure.     Speculum placed in vagina and excellent visualization of cervix achieved, cervix swabbed  with acetic acid solution.    biopsies taken (including ECC): 2   Hemostasis effected with Silver Nitrate.     Findings:    Cervix: no visible lesions and no concerning findings  Vaginal inspection: no visible lesions.  Procedure Summary: Patient tolerated procedure well and colposcopy adequate.      Assessment:   HR HPV of cervix, type 16    Plan:  Specimens labelled and sent to pathology.  Will base further treatment on pathology findings.  Post biopsy instructions given to patient and call to discuss Pathology results.    Brian Min MD

## 2024-11-18 LAB
PATH REPORT.COMMENTS IMP SPEC: NORMAL
PATH REPORT.COMMENTS IMP SPEC: NORMAL
PATH REPORT.FINAL DX SPEC: NORMAL
PATH REPORT.GROSS SPEC: NORMAL
PATH REPORT.MICROSCOPIC SPEC OTHER STN: NORMAL
PATH REPORT.RELEVANT HX SPEC: NORMAL
PHOTO IMAGE: NORMAL

## 2024-11-23 DIAGNOSIS — I10 HYPERTENSION GOAL BP (BLOOD PRESSURE) < 140/90: ICD-10-CM

## 2024-11-23 RX ORDER — AMLODIPINE BESYLATE 10 MG/1
10 TABLET ORAL DAILY
Qty: 90 TABLET | Refills: 1 | Status: SHIPPED | OUTPATIENT
Start: 2024-11-23

## 2024-12-09 ENCOUNTER — PATIENT OUTREACH (OUTPATIENT)
Dept: OBGYN | Facility: CLINIC | Age: 54
End: 2024-12-09
Payer: COMMERCIAL

## 2024-12-17 ENCOUNTER — TELEPHONE (OUTPATIENT)
Dept: FAMILY MEDICINE | Facility: CLINIC | Age: 54
End: 2024-12-17
Payer: COMMERCIAL

## 2024-12-17 NOTE — TELEPHONE ENCOUNTER
Patient Quality Outreach    Patient is due for the following:   Breast Cancer Screening - Mammogram      Topic Date Due    Meningitis A Vaccine (1 - Risk 2-dose series) Never done    Hepatitis B Vaccine (1 of 3 - 19+ 3-dose series) Never done    Pneumococcal Vaccine (3 of 3 - PPSV23 or PCV20) 05/26/2014    Diptheria Tetanus Pertussis (DTAP/TDAP/TD) Vaccine (3 - Td or Tdap) 05/28/2022    Zoster (Shingles) Vaccine (1 of 2) Never done    COVID-19 Vaccine (2 - 2024-25 season) 09/01/2024       Action(s) Taken:   Schedule a office visit for mammogram  Immunizations    Type of outreach:    Sent letter.    Questions for provider review:    None           Mallory Oneill CMA  Chart routed to Care Team.

## 2024-12-17 NOTE — LETTER
December 17, 2024      Gilson SORIANO Enedina  [address] [address]      Your team at [facility] cares about your health. We have reviewed your chart and based on our findings; we are making the following recommendations to better manage your health.     You are in particular need of attention regarding the following:     Schedule Annual MAMMOGRAPHY. [facility] scheduling number is [phone] or schedule in [facility] (self referral).  1 in 8 women will develop invasive breast cancer during her lifetime and it is the most common non-skin cancer in [place] Women. EARLY detection, new treatments, and a better understanding of the disease have increased survival rates- the 5 year survival rate in the 1960's was 63% and today it is close to 90%.  If you are under/uninsured, we recommend you contact the [facility]. They offer mammograms at no charge or on a sliding fee charge. You can schedule with them at [phone]. Please have them send us the results.   Please schedule a Nurse Only Appointment with your primary care clinic to update your immunizations that are due.    If you have already completed these items, please contact the clinic via phone or   [facility] so your care team can review and update your records. Thank you for   choosing [facility] for your healthcare needs. For any questions,   concerns, or to schedule an appointment please contact our clinic.    Healthy Regards,      Your [facility] Care Team

## 2025-07-21 ENCOUNTER — PATIENT OUTREACH (OUTPATIENT)
Dept: CARE COORDINATION | Facility: CLINIC | Age: 55
End: 2025-07-21
Payer: COMMERCIAL

## 2025-07-25 ENCOUNTER — TELEPHONE (OUTPATIENT)
Dept: FAMILY MEDICINE | Facility: CLINIC | Age: 55
End: 2025-07-25
Payer: COMMERCIAL

## 2025-07-25 NOTE — TELEPHONE ENCOUNTER
Forms/Letter Request    Type of form/letter: Disability       Do we have the form/letter: No    Who is the form from? Social security     Where did/will the form come from? Patient or family brought in       When is form/letter needed by: before 8/9/2025    How would you like the form/letter returned:     Patient Notified form requests are processed in 5-7 business days:Yes    Could we send this information to you in [facility] or would you prefer to receive a phone call?:   Patient would prefer a phone call   Okay to leave a detailed message?: Yes at Home number on file [phone] (home)

## 2025-07-28 NOTE — TELEPHONE ENCOUNTER
Form is not for provider to complete. It is for patient to complete. She will come back into      Noé-